# Patient Record
Sex: MALE | Race: WHITE | NOT HISPANIC OR LATINO | Employment: UNEMPLOYED | URBAN - METROPOLITAN AREA
[De-identification: names, ages, dates, MRNs, and addresses within clinical notes are randomized per-mention and may not be internally consistent; named-entity substitution may affect disease eponyms.]

---

## 2017-07-28 ENCOUNTER — ALLSCRIPTS OFFICE VISIT (OUTPATIENT)
Dept: OTHER | Facility: OTHER | Age: 47
End: 2017-07-28

## 2017-08-11 ENCOUNTER — ALLSCRIPTS OFFICE VISIT (OUTPATIENT)
Dept: OTHER | Facility: OTHER | Age: 47
End: 2017-08-11

## 2018-01-10 NOTE — MISCELLANEOUS
Provider Comments  Provider Comments:   Patient is a no call no show for his appointment on July 28th 2017      Signatures   Electronically signed by : Natalya Lovett, ; Jul 28 2017  9:15AM EST                       (Author)

## 2018-01-11 NOTE — MISCELLANEOUS
Provider Comments  Provider Comments:   Patient is a not call no show for his appointment on August 11th      Signatures   Electronically signed by : Sade Ovalle, ; Aug 11 2017 11:20AM EST                       (Author)

## 2020-09-08 ENCOUNTER — HOSPITAL ENCOUNTER (EMERGENCY)
Facility: HOSPITAL | Age: 50
Discharge: HOME | End: 2020-09-08
Attending: EMERGENCY MEDICINE | Admitting: EMERGENCY MEDICINE
Payer: COMMERCIAL

## 2020-09-08 VITALS
WEIGHT: 162 LBS | DIASTOLIC BLOOD PRESSURE: 89 MMHG | TEMPERATURE: 97.8 F | HEART RATE: 88 BPM | RESPIRATION RATE: 20 BRPM | OXYGEN SATURATION: 100 % | SYSTOLIC BLOOD PRESSURE: 133 MMHG

## 2020-09-08 DIAGNOSIS — M54.32 SCIATICA OF LEFT SIDE: ICD-10-CM

## 2020-09-08 DIAGNOSIS — M54.9 BACK PAIN: Primary | ICD-10-CM

## 2020-09-08 PROCEDURE — 99283 EMERGENCY DEPT VISIT LOW MDM: CPT

## 2020-09-08 PROCEDURE — 99284 EMERGENCY DEPT VISIT MOD MDM: CPT | Performed by: EMERGENCY MEDICINE

## 2020-09-08 PROCEDURE — 96372 THER/PROPH/DIAG INJ SC/IM: CPT

## 2020-09-08 RX ORDER — METHOCARBAMOL 500 MG/1
1500 TABLET, FILM COATED ORAL ONCE
Status: COMPLETED | OUTPATIENT
Start: 2020-09-08 | End: 2020-09-08

## 2020-09-08 RX ORDER — KETOROLAC TROMETHAMINE 30 MG/ML
30 INJECTION, SOLUTION INTRAMUSCULAR; INTRAVENOUS ONCE
Status: COMPLETED | OUTPATIENT
Start: 2020-09-08 | End: 2020-09-08

## 2020-09-08 RX ORDER — METHOCARBAMOL 500 MG/1
1000 TABLET, FILM COATED ORAL 2 TIMES DAILY
Qty: 30 TABLET | Refills: 0 | Status: ON HOLD | OUTPATIENT
Start: 2020-09-08 | End: 2021-03-09 | Stop reason: SDUPTHER

## 2020-09-08 RX ORDER — IBUPROFEN 800 MG/1
800 TABLET ORAL 3 TIMES DAILY
Qty: 21 TABLET | Refills: 0 | Status: ON HOLD | OUTPATIENT
Start: 2020-09-08 | End: 2021-03-09 | Stop reason: SDUPTHER

## 2020-09-08 RX ADMIN — METHOCARBAMOL 1500 MG: 500 TABLET, FILM COATED ORAL at 15:56

## 2020-09-08 RX ADMIN — KETOROLAC TROMETHAMINE 30 MG: 30 INJECTION, SOLUTION INTRAMUSCULAR; INTRAVENOUS at 15:56

## 2020-09-08 NOTE — Clinical Note
Antonette Mesa was seen and treated in our emergency department on 9/8/2020  Diagnosis:     Patrick Sotomayor  may return to work on return date  He may return on this date: 09/10/2020         If you have any questions or concerns, please don't hesitate to call        Elon Baumgarten, DO    ______________________________           _______________          _______________  Hospital Representative                              Date                                Time

## 2020-09-09 ENCOUNTER — TELEPHONE (OUTPATIENT)
Dept: PHYSICAL THERAPY | Facility: OTHER | Age: 50
End: 2020-09-09

## 2020-09-09 NOTE — TELEPHONE ENCOUNTER
Attempted to call patient per [facility] but unable to leave a voice message due to Voice Mailbox being full

## 2020-09-09 NOTE — ED PROVIDER NOTES
History  Chief Complaint   Patient presents with    Back Pain     pt has back pain that he states is debilitating and "the worst pain he ever had" started Saturday, became worse Sunday  left sided lower back radiating down leg  no recent injuries or hx of back pain       History provided by:  Patient  Back Pain   Location:  Lumbar spine  Quality:  Aching  Radiates to:  Does not radiate  Pain severity:  Moderate  Onset quality:  Gradual  Timing:  Constant  Progression:  Worsening  Chronicity:  New  Relieved by:  Nothing  Worsened by:  Nothing  Ineffective treatments:  None tried  Associated symptoms: no abdominal pain, no abdominal swelling, no bladder incontinence, no bowel incontinence, no chest pain, no dysuria, no fever, no headaches, no leg pain, no numbness, no pelvic pain, no perianal numbness, no tingling and no weakness        None       Past Medical History:   Diagnosis Date    Hyperlipidemia     Hypertension        History reviewed  No pertinent surgical history  History reviewed  No pertinent family history  I have reviewed and agree with the history as documented  E-Cigarette/Vaping    E-Cigarette Use Never User      E-Cigarette/Vaping Substances    Nicotine No     THC No     CBD No     Flavoring No     Other No     Unknown No      Social History     Tobacco Use    Smoking status: Current Every Day Smoker     Packs/day: 1 00    Smokeless tobacco: Never Used   Substance Use Topics    Alcohol use: Never     Frequency: Never    Drug use: Never       Review of Systems   Constitutional: Negative for chills and fever  HENT: Negative for rhinorrhea, sore throat and trouble swallowing  Eyes: Negative for pain  Respiratory: Negative for cough, shortness of breath, wheezing and stridor  Cardiovascular: Negative for chest pain and leg swelling  Gastrointestinal: Negative for abdominal pain, bowel incontinence, diarrhea and nausea  Endocrine: Negative for polyuria     Genitourinary: Negative for bladder incontinence, dysuria, flank pain, pelvic pain and urgency  Musculoskeletal: Positive for back pain  Negative for joint swelling, myalgias and neck stiffness  Skin: Negative for rash  Allergic/Immunologic: Negative for immunocompromised state  Neurological: Negative for dizziness, tingling, syncope, weakness, numbness and headaches  Psychiatric/Behavioral: Negative for confusion and suicidal ideas  All other systems reviewed and are negative  Physical Exam  Physical Exam  Vitals signs and nursing note reviewed  Constitutional:       Appearance: He is well-developed  HENT:      Head: Normocephalic and atraumatic  Eyes:      Pupils: Pupils are equal, round, and reactive to light  Neck:      Musculoskeletal: Normal range of motion and neck supple  Cardiovascular:      Rate and Rhythm: Normal rate and regular rhythm  Heart sounds: No murmur  No friction rub  Pulmonary:      Effort: No respiratory distress  Breath sounds: Normal breath sounds  No wheezing or rales  Abdominal:      General: Bowel sounds are normal  There is no distension  Palpations: Abdomen is soft  Tenderness: There is no abdominal tenderness  Musculoskeletal: Normal range of motion  General: Tenderness present  Skin:     General: Skin is warm  Findings: No rash  Neurological:      Mental Status: He is alert and oriented to person, place, and time           Vital Signs  ED Triage Vitals [09/08/20 1521]   Temperature Pulse Respirations Blood Pressure SpO2   97 8 °F (36 6 °C) 88 20 133/89 100 %      Temp Source Heart Rate Source Patient Position - Orthostatic VS BP Location FiO2 (%)   Tympanic Monitor Lying Left arm --      Pain Score       Worst Possible Pain           Vitals:    09/08/20 1521   BP: 133/89   Pulse: 88   Patient Position - Orthostatic VS: Lying         Visual Acuity      ED Medications  Medications   ketorolac (TORADOL) injection 30 mg (30 mg Intramuscular Given 9/8/20 1556)   methocarbamol (ROBAXIN) tablet 1,500 mg (1,500 mg Oral Given 9/8/20 1556)       Diagnostic Studies  Results Reviewed     None                 No orders to display              Procedures  Procedures         ED Course                                       MDM  Number of Diagnoses or Management Options  Back pain: new and requires workup  Sciatica of left side: new and requires workup  Diagnosis management comments: 48 y o  male presents with chief complaint of left low back pain radiating into left  leg  No concerning s/s for cauda equina or epidural abscess  Will treat with analgesia and anti-inflammatory regimen and refer to spine if symptoms persist        No spinous process tenderness, hypertonicity paraspinal musculature consistent with acute muscle spasm , +2 patella and Achilles reflex bilaterally  Normal sensation normal muscle strength bilateral lower extremities    Denies history of severe or worsening lower extremity weakness/numbness  Denies history of saddle anesthesia/perineal anesthesia  Denies bowel or bladder incontinence/retention  History does not suggest diagnosis of cauda equina syndrome  Patient denies history of IVDA, denies history of fevers, no recent surgeries or any procedures to suggest a transient bacteremia leading to a diagnosis of subdural abscess  Denies history of blood thinner use with recent history of lumbar puncture or any violation of the epidural space to suggest history of epidural hematoma  Therefore these above diagnoses (cauda equina syndrome, epidural abscess, epidural hematoma) were not pursued with diagnostic imaging  Pt with non-traumatic back pain  Considered cauda equina, pathologic spine injuries, AAA, aortic dissection, epidural abscess, cord syndromes, ureterolithiasis, pyelonephritis  Pt has no indications for emergent imaging as pt not elderly, denies fever, denies IVDA, no hx of cancer, no chronic steroid use, denies loss of bowel or bladder control, denies numbness in perinuem, on exam MS intact throughout, including at B/L hips, knees, ankle plantar/dorsiflexion and extensor/flexor hallicus  Pt with good sensation in appropriate dermatomes  Pt able to walk while in ED without difficulty  Suspect pt's pain sciatica pain  Treating with analgesics and discussed importance of return if any difficulty walking, numbness, weakness, loss of bowel or bladder control, pain worsening, fevers, or other concerns  Discussed importance of PCP f/u and that pt will likely require MRI to better evaluate area if pain not improving in about 1 wk (unless sx worsen sooner)  Pt agrees and will return if any further concerns  Pt re-examined and evaluated after testing and treatment  Spoke with the patient and feeling improved and sxs have resolved  Will discharge home with close f/u with pcp and instructed to return to the ED if sxs worsen or continue  Pt agrees with the plan for discharge and feels comfortable to go home with proper f/u  Advised to return for worsening or additional problems  Diagnostic tests were reviewed and questions answered  Diagnosis, care plan and treatment options were discussed  The patient understand instructions and will follow up as directed  Counseling: I had a detailed discussion with the patient and/or guardian regarding: the historical points, exam findings, and any diagnostic results supporting the discharge diagnosis, lab results, radiology results, discharge instructions reviewed with patient and/or family/caregiver and understanding was verbalized  Instructions given to return to the emergency department if symptoms worsen or persist, or if there are any questions or concerns that arise at home       All labs reviewed and utilized in the medical decision making process    All radiology studies independently viewed by me and interpreted by the radiologist             Disposition  Final diagnoses:   Back pain Sciatica of left side     Time reflects when diagnosis was documented in both MDM as applicable and the Disposition within this note     Time User Action Codes Description Comment    9/8/2020  4:48 PM Toñito Alamo Add [M54 9] Back pain     9/8/2020  4:48 PM Toñito Alamo Add [M54 32] Sciatica of left side       ED Disposition     ED Disposition Condition Date/Time Comment    Discharge Stable Tue Sep 8, 2020  4:48 PM Kamala Hurtado discharge to home/self care              Follow-up Information    None         Discharge Medication List as of 9/8/2020  4:49 PM      START taking these medications    Details   ibuprofen (MOTRIN) 800 mg tablet Take 1 tablet (800 mg total) by mouth 3 (three) times a day, Starting Tue 9/8/2020, Normal      methocarbamol (ROBAXIN) 500 mg tablet Take 2 tablets (1,000 mg total) by mouth 2 (two) times a day, Starting Tue 9/8/2020, Normal               PDMP Review     None          ED Provider  Electronically Signed by           Austin Chacon DO  09/08/20 1404

## 2020-09-14 ENCOUNTER — TELEPHONE (OUTPATIENT)
Dept: PHYSICAL THERAPY | Facility: OTHER | Age: 50
End: 2020-09-14

## 2020-09-14 NOTE — TELEPHONE ENCOUNTER
Attempted to call patient per [facility] referral but unable to leave a voice message due to Voice Mailbox being full  This the 2nd attempt to reach the patient  Will defer per protocol

## 2021-03-07 ENCOUNTER — HOSPITAL ENCOUNTER (EMERGENCY)
Facility: HOSPITAL | Age: 51
End: 2021-03-07
Attending: EMERGENCY MEDICINE
Payer: COMMERCIAL

## 2021-03-07 ENCOUNTER — APPOINTMENT (EMERGENCY)
Dept: CT IMAGING | Facility: HOSPITAL | Age: 51
End: 2021-03-07
Payer: COMMERCIAL

## 2021-03-07 ENCOUNTER — APPOINTMENT (OUTPATIENT)
Dept: RADIOLOGY | Facility: HOSPITAL | Age: 51
End: 2021-03-07
Payer: COMMERCIAL

## 2021-03-07 ENCOUNTER — HOSPITAL ENCOUNTER (OUTPATIENT)
Facility: HOSPITAL | Age: 51
Setting detail: OBSERVATION
LOS: 1 days | Discharge: HOME | End: 2021-03-09
Attending: INTERNAL MEDICINE | Admitting: INTERNAL MEDICINE
Payer: COMMERCIAL

## 2021-03-07 ENCOUNTER — APPOINTMENT (EMERGENCY)
Dept: RADIOLOGY | Facility: HOSPITAL | Age: 51
End: 2021-03-07
Payer: COMMERCIAL

## 2021-03-07 VITALS
SYSTOLIC BLOOD PRESSURE: 168 MMHG | OXYGEN SATURATION: 97 % | TEMPERATURE: 97.9 F | WEIGHT: 155 LBS | BODY MASS INDEX: 23.49 KG/M2 | HEART RATE: 84 BPM | RESPIRATION RATE: 16 BRPM | HEIGHT: 68 IN | DIASTOLIC BLOOD PRESSURE: 97 MMHG

## 2021-03-07 DIAGNOSIS — F19.90 IV DRUG USER: Primary | ICD-10-CM

## 2021-03-07 DIAGNOSIS — A41.9 SEPSIS (HCC): ICD-10-CM

## 2021-03-07 DIAGNOSIS — I10 HYPERTENSION: ICD-10-CM

## 2021-03-07 DIAGNOSIS — M54.9 BACK PAIN: ICD-10-CM

## 2021-03-07 DIAGNOSIS — M54.40 ACUTE LEFT-SIDED LOW BACK PAIN WITH SCIATICA: Primary | ICD-10-CM

## 2021-03-07 DIAGNOSIS — M54.32 SCIATICA OF LEFT SIDE: ICD-10-CM

## 2021-03-07 DIAGNOSIS — F15.10 METHAMPHETAMINE USE (HCC): ICD-10-CM

## 2021-03-07 DIAGNOSIS — M54.10 BACK PAIN WITH RADICULOPATHY: ICD-10-CM

## 2021-03-07 DIAGNOSIS — M54.17 RADICULOPATHY, LUMBOSACRAL REGION: ICD-10-CM

## 2021-03-07 PROBLEM — E87.6 HYPOKALEMIA: Status: ACTIVE | Noted: 2021-03-07

## 2021-03-07 LAB
ALBUMIN SERPL BCP-MCNC: 5.2 G/DL (ref 3.5–5.7)
ALP SERPL-CCNC: 89 U/L (ref 40–150)
ALT SERPL W P-5'-P-CCNC: 19 U/L (ref 7–52)
AMPHETAMINES SERPL QL SCN: POSITIVE
ANION GAP SERPL CALCULATED.3IONS-SCNC: 12 MMOL/L (ref 4–13)
AST SERPL W P-5'-P-CCNC: 17 U/L (ref 13–39)
BACTERIA UR QL AUTO: ABNORMAL /HPF
BARBITURATES UR QL: NEGATIVE
BASOPHILS # BLD AUTO: 0.1 THOUSANDS/ΜL (ref 0–0.1)
BASOPHILS NFR BLD AUTO: 1 % (ref 0–2)
BENZODIAZ UR QL: NEGATIVE
BILIRUB SERPL-MCNC: 0.4 MG/DL (ref 0.2–1)
BILIRUB UR QL STRIP: NEGATIVE
BUN SERPL-MCNC: 18 MG/DL (ref 7–25)
CALCIUM SERPL-MCNC: 10.1 MG/DL (ref 8.6–10.5)
CHLORIDE SERPL-SCNC: 93 MMOL/L (ref 98–107)
CLARITY UR: CLEAR
CO2 SERPL-SCNC: 32 MMOL/L (ref 21–31)
COCAINE UR QL: NEGATIVE
COLOR UR: YELLOW
CREAT SERPL-MCNC: 0.92 MG/DL (ref 0.7–1.3)
EOSINOPHIL # BLD AUTO: 0 THOUSAND/ΜL (ref 0–0.61)
EOSINOPHIL NFR BLD AUTO: 0 % (ref 0–5)
ERYTHROCYTE [DISTWIDTH] IN BLOOD BY AUTOMATED COUNT: 14 % (ref 11.5–14.5)
ERYTHROCYTE [SEDIMENTATION RATE] IN BLOOD: 11 MM/HOUR (ref 0–19)
FLUAV RNA RESP QL NAA+PROBE: NEGATIVE
FLUBV RNA RESP QL NAA+PROBE: NEGATIVE
GFR SERPL CREATININE-BSD FRML MDRD: 97 ML/MIN/1.73SQ M
GLUCOSE SERPL-MCNC: 109 MG/DL (ref 65–99)
GLUCOSE UR STRIP-MCNC: NEGATIVE MG/DL
HCT VFR BLD AUTO: 50.5 % (ref 42–47)
HGB BLD-MCNC: 16.8 G/DL (ref 14–18)
HGB UR QL STRIP.AUTO: ABNORMAL
KETONES UR STRIP-MCNC: NEGATIVE MG/DL
LACTATE SERPL-SCNC: 1.3 MMOL/L (ref 0.5–2)
LACTATE SERPL-SCNC: 2.2 MMOL/L (ref 0.5–2)
LEUKOCYTE ESTERASE UR QL STRIP: NEGATIVE
LYMPHOCYTES # BLD AUTO: 4 THOUSANDS/ΜL (ref 0.6–4.47)
LYMPHOCYTES NFR BLD AUTO: 23 % (ref 21–51)
MCH RBC QN AUTO: 29.8 PG (ref 26–34)
MCHC RBC AUTO-ENTMCNC: 33.3 G/DL (ref 31–37)
MCV RBC AUTO: 89 FL (ref 81–99)
METHADONE UR QL: NEGATIVE
MONOCYTES # BLD AUTO: 1 THOUSAND/ΜL (ref 0.17–1.22)
MONOCYTES NFR BLD AUTO: 6 % (ref 2–12)
NEUTROPHILS # BLD AUTO: 12.2 THOUSANDS/ΜL (ref 1.4–6.5)
NEUTS SEG NFR BLD AUTO: 70 % (ref 42–75)
NITRITE UR QL STRIP: NEGATIVE
NON-SQ EPI CELLS URNS QL MICRO: ABNORMAL /HPF
OPIATES UR QL SCN: NEGATIVE
OXYCODONE+OXYMORPHONE UR QL SCN: NEGATIVE
PCP UR QL: NEGATIVE
PH UR STRIP.AUTO: 6 [PH]
PLATELET # BLD AUTO: 330 THOUSANDS/UL (ref 149–390)
PLATELET # BLD AUTO: 419 THOUSANDS/UL (ref 149–390)
PMV BLD AUTO: 7.1 FL (ref 8.6–11.7)
PMV BLD AUTO: 9 FL (ref 8.9–12.7)
POTASSIUM SERPL-SCNC: 3.1 MMOL/L (ref 3.5–5.5)
PROT SERPL-MCNC: 8.4 G/DL (ref 6.4–8.9)
PROT UR STRIP-MCNC: NEGATIVE MG/DL
RBC # BLD AUTO: 5.65 MILLION/UL (ref 4.3–5.9)
RBC #/AREA URNS AUTO: ABNORMAL /HPF
RSV RNA RESP QL NAA+PROBE: NEGATIVE
SARS-COV-2 RNA RESP QL NAA+PROBE: NEGATIVE
SODIUM SERPL-SCNC: 137 MMOL/L (ref 134–143)
SP GR UR STRIP.AUTO: 1.02 (ref 1–1.03)
THC UR QL: NEGATIVE
UROBILINOGEN UR QL STRIP.AUTO: 0.2 E.U./DL
WBC # BLD AUTO: 17.4 THOUSAND/UL (ref 4.8–10.8)
WBC #/AREA URNS AUTO: ABNORMAL /HPF

## 2021-03-07 PROCEDURE — 83605 ASSAY OF LACTIC ACID: CPT | Performed by: INTERNAL MEDICINE

## 2021-03-07 PROCEDURE — 36415 COLL VENOUS BLD VENIPUNCTURE: CPT | Performed by: PHYSICIAN ASSISTANT

## 2021-03-07 PROCEDURE — 81001 URINALYSIS AUTO W/SCOPE: CPT | Performed by: PHYSICIAN ASSISTANT

## 2021-03-07 PROCEDURE — G0379 DIRECT REFER HOSPITAL OBSERV: HCPCS

## 2021-03-07 PROCEDURE — 72158 MRI LUMBAR SPINE W/O & W/DYE: CPT

## 2021-03-07 PROCEDURE — 72131 CT LUMBAR SPINE W/O DYE: CPT

## 2021-03-07 PROCEDURE — 80053 COMPREHEN METABOLIC PANEL: CPT | Performed by: PHYSICIAN ASSISTANT

## 2021-03-07 PROCEDURE — 83605 ASSAY OF LACTIC ACID: CPT | Performed by: PHYSICIAN ASSISTANT

## 2021-03-07 PROCEDURE — 0241U HB NFCT DS VIR RESP RNA 4 TRGT: CPT | Performed by: PHYSICIAN ASSISTANT

## 2021-03-07 PROCEDURE — G1004 CDSM NDSC: HCPCS

## 2021-03-07 PROCEDURE — 99285 EMERGENCY DEPT VISIT HI MDM: CPT | Performed by: PHYSICIAN ASSISTANT

## 2021-03-07 PROCEDURE — 85652 RBC SED RATE AUTOMATED: CPT | Performed by: PHYSICIAN ASSISTANT

## 2021-03-07 PROCEDURE — 87040 BLOOD CULTURE FOR BACTERIA: CPT | Performed by: PHYSICIAN ASSISTANT

## 2021-03-07 PROCEDURE — 85025 COMPLETE CBC W/AUTO DIFF WBC: CPT | Performed by: PHYSICIAN ASSISTANT

## 2021-03-07 PROCEDURE — 85049 AUTOMATED PLATELET COUNT: CPT | Performed by: INTERNAL MEDICINE

## 2021-03-07 PROCEDURE — 96375 TX/PRO/DX INJ NEW DRUG ADDON: CPT

## 2021-03-07 PROCEDURE — 96361 HYDRATE IV INFUSION ADD-ON: CPT

## 2021-03-07 PROCEDURE — 96365 THER/PROPH/DIAG IV INF INIT: CPT

## 2021-03-07 PROCEDURE — 80307 DRUG TEST PRSMV CHEM ANLYZR: CPT | Performed by: PHYSICIAN ASSISTANT

## 2021-03-07 PROCEDURE — 99285 EMERGENCY DEPT VISIT HI MDM: CPT

## 2021-03-07 PROCEDURE — 81003 URINALYSIS AUTO W/O SCOPE: CPT | Performed by: PHYSICIAN ASSISTANT

## 2021-03-07 PROCEDURE — 71045 X-RAY EXAM CHEST 1 VIEW: CPT

## 2021-03-07 RX ORDER — SODIUM CHLORIDE, SODIUM GLUCONATE, SODIUM ACETATE, POTASSIUM CHLORIDE, MAGNESIUM CHLORIDE, SODIUM PHOSPHATE, DIBASIC, AND POTASSIUM PHOSPHATE .53; .5; .37; .037; .03; .012; .00082 G/100ML; G/100ML; G/100ML; G/100ML; G/100ML; G/100ML; G/100ML
1000 INJECTION, SOLUTION INTRAVENOUS ONCE
Status: COMPLETED | OUTPATIENT
Start: 2021-03-07 | End: 2021-03-07

## 2021-03-07 RX ORDER — LISINOPRIL 40 MG/1
20 TABLET ORAL 2 TIMES DAILY
COMMUNITY
End: 2021-03-09 | Stop reason: HOSPADM

## 2021-03-07 RX ORDER — METHOCARBAMOL 500 MG/1
500 TABLET, FILM COATED ORAL EVERY 6 HOURS PRN
Status: DISCONTINUED | OUTPATIENT
Start: 2021-03-07 | End: 2021-03-09

## 2021-03-07 RX ORDER — KETOROLAC TROMETHAMINE 30 MG/ML
30 INJECTION, SOLUTION INTRAMUSCULAR; INTRAVENOUS ONCE
Status: COMPLETED | OUTPATIENT
Start: 2021-03-07 | End: 2021-03-07

## 2021-03-07 RX ORDER — MULTIVIT-MIN/IRON FUM/FOLIC AC 7.5 MG-4
1 TABLET ORAL DAILY
COMMUNITY

## 2021-03-07 RX ORDER — CEFEPIME HYDROCHLORIDE 2 G/50ML
2000 INJECTION, SOLUTION INTRAVENOUS ONCE
Status: COMPLETED | OUTPATIENT
Start: 2021-03-07 | End: 2021-03-07

## 2021-03-07 RX ORDER — IBUPROFEN 400 MG/1
400 TABLET ORAL EVERY 6 HOURS PRN
Status: DISCONTINUED | OUTPATIENT
Start: 2021-03-07 | End: 2021-03-08

## 2021-03-07 RX ORDER — POTASSIUM CHLORIDE 20 MEQ/1
40 TABLET, EXTENDED RELEASE ORAL ONCE
Status: COMPLETED | OUTPATIENT
Start: 2021-03-07 | End: 2021-03-07

## 2021-03-07 RX ORDER — VANCOMYCIN HYDROCHLORIDE 1 G/200ML
15 INJECTION, SOLUTION INTRAVENOUS ONCE
Status: DISCONTINUED | OUTPATIENT
Start: 2021-03-07 | End: 2021-03-07 | Stop reason: HOSPADM

## 2021-03-07 RX ORDER — ACETAMINOPHEN 325 MG/1
650 TABLET ORAL EVERY 6 HOURS PRN
Status: DISCONTINUED | OUTPATIENT
Start: 2021-03-07 | End: 2021-03-09 | Stop reason: HOSPADM

## 2021-03-07 RX ORDER — ACETAMINOPHEN 500 MG
1000 TABLET ORAL EVERY 6 HOURS PRN
COMMUNITY

## 2021-03-07 RX ORDER — NICOTINE 21 MG/24HR
1 PATCH, TRANSDERMAL 24 HOURS TRANSDERMAL DAILY
Status: DISCONTINUED | OUTPATIENT
Start: 2021-03-08 | End: 2021-03-09 | Stop reason: HOSPADM

## 2021-03-07 RX ORDER — POLYETHYLENE GLYCOL 3350 17 G/17G
17 POWDER, FOR SOLUTION ORAL DAILY
Status: DISCONTINUED | OUTPATIENT
Start: 2021-03-08 | End: 2021-03-09 | Stop reason: HOSPADM

## 2021-03-07 RX ADMIN — ACETAMINOPHEN 650 MG: 325 TABLET ORAL at 23:29

## 2021-03-07 RX ADMIN — SODIUM CHLORIDE, SODIUM GLUCONATE, SODIUM ACETATE, POTASSIUM CHLORIDE, MAGNESIUM CHLORIDE, SODIUM PHOSPHATE, DIBASIC, AND POTASSIUM PHOSPHATE 1000 ML: .53; .5; .37; .037; .03; .012; .00082 INJECTION, SOLUTION INTRAVENOUS at 18:16

## 2021-03-07 RX ADMIN — KETOROLAC TROMETHAMINE 30 MG: 30 INJECTION, SOLUTION INTRAMUSCULAR; INTRAVENOUS at 17:21

## 2021-03-07 RX ADMIN — POTASSIUM CHLORIDE 40 MEQ: 1500 TABLET, EXTENDED RELEASE ORAL at 17:56

## 2021-03-07 RX ADMIN — METHOCARBAMOL 500 MG: 500 TABLET, FILM COATED ORAL at 21:15

## 2021-03-07 RX ADMIN — CEFEPIME HYDROCHLORIDE 2000 MG: 2 INJECTION, SOLUTION INTRAVENOUS at 18:11

## 2021-03-07 RX ADMIN — SODIUM CHLORIDE 1000 ML: 0.9 INJECTION, SOLUTION INTRAVENOUS at 17:19

## 2021-03-07 RX ADMIN — IBUPROFEN 400 MG: 400 TABLET ORAL at 21:15

## 2021-03-07 RX ADMIN — VANCOMYCIN HYDROCHLORIDE 1000 MG: 1 INJECTION, SOLUTION INTRAVENOUS at 18:41

## 2021-03-07 NOTE — EMTALA/ACUTE CARE TRANSFER
[facility]  [address] [address] [phone]  Dept: [phone]      EMTALA TRANSFER CONSENT    NAME Jeny Sequeira                                         1970                              MRN [id number]    I have been informed of my rights regarding examination, treatment, and transfer   by Dr Ritchie Galvan III, DO    Benefits: Specialized equipment and/or services available at the receiving facility (Include comment)________________________    Risks: Potential for delay in receiving treatment      Transfer Request   I acknowledge that my medical condition has been evaluated and explained to me by the emergency department physician or other qualified medical person and/or my attending physician who has recommended and offered to me further medical examination and treatment  I understand the Hospital's obligation with respect to the treatment and stabilization of my emergency medical condition  I nevertheless request to be transferred  I release the Hospital, the doctor, and any other persons caring for me from all responsibility or liability for any injury or ill effects that may result from my transfer and agree to accept all responsibility for the consequences of my choice to transfer, rather than receive stabilizing treatment at the Hospital  I understand that because the transfer is my request, my insurance may not provide reimbursement for the services  The Hospital will assist and direct me and my family in how to make arrangements for transfer, but the hospital is not liable for any fees charged by the transport service  In spite of this understanding, I refuse to consent to further medical examination and treatment which has been offered to me, and request transfer to  [facility] [address]   I authorize the performance of emergency medical procedures and treatments upon me in both transit and upon arrival at the receiving facility  Additionally, I authorize the release of any and all medical records to the receiving facility and request they be transported with me, if possible  I authorize the performance of emergency medical procedures and treatments upon me in both transit and upon arrival at the receiving facility  Additionally, I authorize the release of any and all medical records to the receiving facility and request they be transported with me, if possible  I understand that the safest mode of transportation during a medical emergency is an ambulance and that the Hospital advocates the use of this mode of transport  Risks of traveling to the receiving facility by car, including absence of medical control, life sustaining equipment, such as oxygen, and medical personnel has been explained to me and I fully understand them  (LUCY CORRECT BOX BELOW)  [  ]  I consent to the stated transfer and to be transported by ambulance/helicopter  [  ]  I consent to the stated transfer, but refuse transportation by ambulance and accept full responsibility for my transportation by car  I understand the risks of non-ambulance transfers and I exonerate the Hospital and its staff from any deterioration in my condition that results from this refusal     X___________________________________________    DATE  21  TIME________  Signature of patient or legally responsible individual signing on patient behalf           RELATIONSHIP TO PATIENT_________________________          Provider Certification    NAME Sanjuanita Mitchell                                        [facility] 1970                              MRN [id number]    A medical screening exam was performed on the above named patient  Based on the examination:    Condition Necessitating Transfer The primary encounter diagnosis was IV drug user   Diagnoses of Methamphetamine use ([facility] [address] ), Sepsis ([facility] [address] ), and Back pain with radiculopathy were also pertinent to this visit  Patient Condition: The patient has been stabilized such that within reasonable medical probability, no material deterioration of the patient condition or the condition of the unborn child(diego) is likely to result from the transfer    Reason for Transfer: Level of Care needed not available at this facility    Transfer Requirements: [facility] King   · Space available and qualified personnel available for treatment as acknowledged by    · Agreed to accept transfer and to provide appropriate medical treatment as acknowledged by       Dr Yusuf Bernal  · Appropriate medical records of the examination and treatment of the patient are provided at the time of transfer   [address] [facility] _______  · Transfer will be performed by qualified personnel from    and appropriate transfer equipment as required, including the use of necessary and appropriate life support measures      Provider Certification: I have examined the patient and explained the following risks and benefits of being transferred/refusing transfer to the patient/family:  General risk, such as traffic hazards, adverse weather conditions, rough terrain or turbulence, possible failure of equipment (including vehicle or aircraft), or consequences of actions of persons outside the control of the transport personnel, Unanticipated needs of medical equipment and personnel during transport, Risk of worsening condition, The possibility of a transport vehicle being unavailable, Consent was not obtained as patient is committed to psychiatric facility and transfer is mandated      Based on these reasonable risks and benefits to the patient and/or the unborn child(diego), and based upon the information available at the time of the patients examination, I certify that the medical benefits reasonably to be expected from the provision of appropriate medical treatments at another medical facility outweigh the increasing risks, if any, to the individuals medical condition, and in the case of labor to the unborn child, from effecting the transfer      X____________________________________________ DATE 03/07/21        TIME_______      ORIGINAL - SEND TO MEDICAL RECORDS   COPY - SEND WITH PATIENT DURING TRANSFER

## 2021-03-07 NOTE — LETTER
[facility] [address] [address]  Dept: [phone]    March 9, 2021     Patient: Curt Pimentel   YOB: 1970   Date of Visit: 3/7/2021       To Whom it May Concern:    Odin Monzon is under my professional care  He was seen in the hospital from 3/7/2021   to 03/09/21  He may return to work on 3/16/21 or when tolerated with the following limitations : patient will need lumbar support brace and may need additional breaks or considerations  If you have any questions or concerns, please don't hesitate to call           Sincerely,          Deandra Rock MD

## 2021-03-07 NOTE — ED ATTENDING ATTESTATION
3/7/2021  I, [facility], saw and evaluated the patient  I have discussed the patient with the resident/non-physician practitioner and agree with the resident's/non-physician practitioner's findings, Plan of Care, and MDM as documented in the resident's/non-physician practitioner's note, except where noted  All available labs and Radiology studies were reviewed  I was present for key portions of any procedure(s) performed by the resident/non-physician practitioner and I was immediately available to provide assistance  At this point I agree with the current assessment done in the Emergency Department  I have conducted an independent evaluation of this patient a history and physical is as follows:    ED Course         Critical Care Time  Procedures    This is a very pleasant 59-year-old gentleman presents the emergency department with a history of hyperlipidemia and hypertension  Patient reports worsening mid lumbar lower back pain over 24-36 hours worsening with radiation to L lower sciatic radicular component  Patient reports having a significant diaphoresis worsening today's never had this before  Patient has a longstanding history of IV drug use, drug of choice recently is IV methamphetamines  Prior use was heroin where he has been in rehab twice  Patient reports have trouble emptying his bladder today  Prior back pain, seen at [facility]: Sept 2020: [facility]: no follow with this service as per patient  Post void residual: 97 cc  18 K WBC with elevated lactate  On exam pt is extremely diaphoretic  BC x 2 ordered, see PA-C note for specifics, pt will need transfer to [facility] for MRI and work up  Diff dx:  Epidural abscess versus spinal abscess versus osteomyelitis of the lumbar spine versus endocarditis (no murmur on exam, no [place] lesions  / splinter hemorrhages noted

## 2021-03-07 NOTE — ED NOTES
Report given EMS via bedside report   Medication handoff to EMS      Carin Mohamud RN  03/07/21 0638

## 2021-03-07 NOTE — ASSESSMENT & PLAN NOTE
Reports IV use of methamphetamine mixed with tap water  Urine screen positive  Reported to ED that he used yesterday but not today     At risk for bacteremia, SEA, endocarditis   CM consulted - patient refuses all information and interventions   Advised to f/u w/ PCP and discussed dangers of continuing use, particularly with ongoing health issues, patient endorsed understanding and agreement

## 2021-03-07 NOTE — ED PROVIDER NOTES
History  Chief Complaint   Patient presents with    Hip Pain     Dx sciatita over the summer, LEFT hip pain shooting down leg      79-year-old male presents emerged department complaining of worsening left lower back pain radiating down the left leg  He also reports new onset diaphoresis and difficulty initiating urination and difficulty passing bowel movements  He states that he is an active IV drug user who last used IV methamphetamine last night  He states that he does occasionally reuse needles and syringes  He mixes his methamphetamine with bottled water  He presents significantly diaphoretic with an abnormal gait  He does have a history of sciatica however he states that his sciatica as primarily on his right side and is not this severe  He states this pain is definitely different than his sciatica on the right side previously  He provides "something is wrong  "  He states that he feels sick  He denies chest pain nausea vomiting shortness of breath      Allergies reviewed          Prior to Admission Medications   Prescriptions Last Dose Informant Patient Reported? Taking?   ibuprofen (MOTRIN) 800 mg tablet   No No   Sig: Take 1 tablet (800 mg total) by mouth 3 (three) times a day   methocarbamol (ROBAXIN) 500 mg tablet   No No   Sig: Take 2 tablets (1,000 mg total) by mouth 2 (two) times a day      Facility-Administered Medications: None       Past Medical History:   Diagnosis Date    Hyperlipidemia     Hypertension        History reviewed  No pertinent surgical history  History reviewed  No pertinent family history  I have reviewed and agree with the history as documented      E-Cigarette/Vaping    E-Cigarette Use Never User      E-Cigarette/Vaping Substances    Nicotine No     THC No     CBD No     Flavoring No     Other No     Unknown No      Social History     Tobacco Use    Smoking status: Current Every Day Smoker     Packs/day: 1 00    Smokeless tobacco: Never Used   Substance Use Topics    Alcohol use: Never     Frequency: Never    Drug use: Never       Review of Systems   Constitutional: Negative for chills, fatigue and fever  HENT: Negative for congestion, ear pain, rhinorrhea, sinus pressure, sneezing and sore throat  Eyes: Negative for pain and discharge  Respiratory: Negative for cough, choking, chest tightness, shortness of breath and wheezing  Cardiovascular: Negative for chest pain and palpitations  Gastrointestinal: Negative for abdominal pain, constipation, diarrhea, nausea and vomiting  Genitourinary: Negative for difficulty urinating and dysuria  Musculoskeletal: Positive for arthralgias, back pain and gait problem  Negative for neck pain and neck stiffness  Neurological: Negative for dizziness, light-headedness and headaches  All other systems reviewed and are negative  Physical Exam  Physical Exam  Vitals signs and nursing note reviewed  Constitutional:       General: He is not in acute distress  Appearance: He is well-developed  He is ill-appearing and diaphoretic  HENT:      Head: Normocephalic and atraumatic  Right Ear: External ear normal       Left Ear: External ear normal       Nose: Nose normal    Eyes:      Pupils: Pupils are equal, round, and reactive to light  Neck:      Musculoskeletal: Normal range of motion and neck supple  Cardiovascular:      Rate and Rhythm: Normal rate and regular rhythm  Heart sounds: Normal heart sounds  No murmur  No friction rub  No gallop  Pulmonary:      Effort: Pulmonary effort is normal  No respiratory distress  Breath sounds: Normal breath sounds  No stridor  No wheezing or rales  Abdominal:      General: Bowel sounds are normal  There is no distension  Palpations: Abdomen is soft  Tenderness: There is no abdominal tenderness  There is no guarding  Musculoskeletal: Normal range of motion  Lumbar back: He exhibits tenderness          Back:    Skin:     General: Skin is warm  Capillary Refill: Capillary refill takes less than 2 seconds  Neurological:      Mental Status: He is alert and oriented to person, place, and time  Psychiatric:         Behavior: Behavior is cooperative  Vital Signs  ED Triage Vitals [03/07/21 1623]   Temperature Pulse Respirations Blood Pressure SpO2   97 9 °F (36 6 °C) 98 16 (!) 171/101 99 %      Temp Source Heart Rate Source Patient Position - Orthostatic VS BP Location FiO2 (%)   Oral Monitor -- -- --      Pain Score       5           Vitals:    03/07/21 1623 03/07/21 1815   BP: (!) 171/101 168/97   Pulse: 98 84         Visual Acuity      ED Medications  Medications   sodium chloride 0 9 % bolus 1,000 mL (1,000 mL Intravenous New Bag 3/7/21 1719)   multi-electrolyte (ISOLYTE-S PH 7 4) bolus 1,000 mL (1,000 mL Intravenous New Bag 3/7/21 1816)   ketorolac (TORADOL) injection 30 mg (30 mg Intravenous Given 3/7/21 1721)   potassium chloride (K-DUR,KLOR-CON) CR tablet 40 mEq (40 mEq Oral Given 3/7/21 1756)   cefepime (MAXIPIME) IVPB (premix in dextrose) 2,000 mg 50 mL (0 mg Intravenous Stopped 3/7/21 1841)       Diagnostic Studies  Results Reviewed     Procedure Component Value Units Date/Time    COVID19, Influenza A/B, RSV PCR, [facility] [[id number]]  (Normal) Collected: 03/07/21 1717    Lab Status: Final result Specimen: Nares from Nasopharyngeal Swab Updated: 03/07/21 1810     SARS-CoV-2 Negative     INFLUENZA A PCR Negative     INFLUENZA B PCR Negative     RSV PCR Negative    Narrative: This test has been authorized by FDA under an EUA (Emergency Use Assay) for use by authorized laboratories  Clinical caution and judgement should be used with the interpretation of these results with consideration of the clinical impression and other laboratory testing  Testing reported as "Positive" or "Negative" has been proven to be accurate according to standard laboratory validation requirements    All testing is performed with control materials showing appropriate reactivity at standard intervals  Lactic acid [[id number]]  (Abnormal) Collected: 03/07/21 1716    Lab Status: Final result Specimen: Blood from Arm, Left Updated: 03/07/21 1748     LACTIC ACID 2 2 mmol/L     Narrative:      Result may be elevated if tourniquet was used during collection      Lactic acid 2 Hours [[id number]]     Lab Status: No result Specimen: Blood     Comprehensive metabolic panel [[id number]]  (Abnormal) Collected: 03/07/21 1716    Lab Status: Final result Specimen: Blood from Arm, Left Updated: 03/07/21 1747     Sodium 137 mmol/L      Potassium 3 1 mmol/L      Chloride 93 mmol/L      CO2 32 mmol/L      ANION GAP 12 mmol/L      BUN 18 mg/dL      Creatinine 0 92 mg/dL      Glucose 109 mg/dL      Calcium 10 1 mg/dL      AST 17 U/L      ALT 19 U/L      Alkaline Phosphatase 89 U/L      Total Protein 8 4 g/dL      Albumin 5 2 g/dL      Total Bilirubin 0 40 mg/dL      eGFR 97 ml/min/1 73sq m     Narrative:      [place] guidelines for Chronic Kidney Disease (CKD):     Stage 1 with normal or high GFR (GFR > 90 mL/min/1 73 square meters)    Stage 2 Mild CKD (GFR = 60-89 mL/min/1 73 square meters)    Stage 3A Moderate CKD (GFR = 45-59 mL/min/1 73 square meters)    Stage 3B Moderate CKD (GFR = 30-44 mL/min/1 73 square meters)    Stage 4 Severe CKD (GFR = 15-29 mL/min/1 73 square meters)    Stage 5 End Stage CKD (GFR <15 mL/min/1 73 square meters)  Note: GFR calculation is accurate only with a steady state creatinine    Rapid drug screen, urine [[id number]]  (Abnormal) Collected: 03/07/21 1716    Lab Status: Final result Specimen: Urine, Clean Catch Updated: 03/07/21 1742     Amph/Meth UR Positive     Barbiturate Ur Negative     Benzodiazepine Urine Negative     Cocaine Urine Negative     Methadone Urine Negative     Opiate Urine Negative     PCP Ur Negative     THC Urine Negative     Oxycodone Urine Negative    Narrative:      FOR MEDICAL PURPOSES ONLY    IF CONFIRMATION NEEDED PLEASE CONTACT THE LAB WITHIN 5 DAYS      Drug Screen Cutoff Levels:  AMPHETAMINE/METHAMPHETAMINES  1000 ng/mL  BARBITURATES     200 ng/mL  BENZODIAZEPINES     200 ng/mL  COCAINE      300 ng/mL  METHADONE      300 ng/mL  OPIATES      300 ng/mL  PHENCYCLIDINE     25 ng/mL  THC       50 ng/mL  OXYCODONE      100 ng/mL    Urine Microscopic [[id number]]  (Abnormal) Collected: 03/07/21 1716    Lab Status: Final result Specimen: Urine, Clean Catch Updated: 03/07/21 1735     RBC, UA 4-10 /hpf      WBC, UA 0-1 /hpf      Epithelial Cells Occasional /hpf      Bacteria, UA None Seen /hpf     Sedimentation rate, automated [[id number]]  (Normal) Collected: 03/07/21 1716    Lab Status: Final result Specimen: Blood from Arm, Left Updated: 03/07/21 1729     Sed Rate 11 mm/hour     CBC and differential [[id number]]  (Abnormal) Collected: 03/07/21 1716    Lab Status: Final result Specimen: Blood from Arm, Left Updated: 03/07/21 1729     WBC 17 40 Thousand/uL      RBC 5 65 Million/uL      Hemoglobin 16 8 g/dL      Hematocrit 50 5 %      MCV 89 fL      MCH 29 8 pg      MCHC 33 3 g/dL      RDW 14 0 %      MPV 7 1 fL      Platelets 568 Thousands/uL      Neutrophils Relative 70 %      Lymphocytes Relative 23 %      Monocytes Relative 6 %      Eosinophils Relative 0 %      Basophils Relative 1 %      Neutrophils Absolute 12 20 Thousands/µL      Lymphocytes Absolute 4 00 Thousands/µL      Monocytes Absolute 1 00 Thousand/µL      Eosinophils Absolute 0 00 Thousand/µL      Basophils Absolute 0 10 Thousands/µL     UA (URINE) with reflex to Scope [[id number]]  (Abnormal) Collected: 03/07/21 1716    Lab Status: Final result Specimen: Urine, Clean Catch Updated: 03/07/21 1726     Color, UA Yellow     Clarity, UA Clear     Specific [place], UA 1 020     pH, UA 6 0     Leukocytes, UA Negative     Nitrite, UA Negative     Protein, UA Negative mg/dl      Glucose, UA Negative mg/dl      Ketones, UA Negative mg/dl Urobilinogen, UA 0 2 E U /dl      Bilirubin, UA Negative     Blood, UA 2+    Blood culture #1 [[id number]] Collected: 03/07/21 1716    Lab Status: In process Specimen: Blood from Arm, Right Updated: 03/07/21 1721    Blood culture #2 [[id number]] Collected: 03/07/21 1716    Lab Status: In process Specimen: Blood from Arm, Left Updated: 03/07/21 1721                 XR chest 1 view portable    (Results Pending)   CT spine lumbar without contrast    (Results Pending)              Procedures  Procedures         ED Course  ED Course as of Mar 07 1953   [place] Scale Mar 07, 2021   1710   Spoke with Dr Mary Puentes regarding MRI  Dr Mary Puentes approved  Patient will need to be transferred for MRI capability  [id number]  Patient reports injecting methamphetamines for the last several months  He states that sometimes he reviews is the same needle and syringe  He mixes his methamphetamine with water prior to injecting it  He states that he usually uses bottled water  1732 WBC(!): 17 40   1732 Absolute Neutrophils(!): 12 20   1749 LACTIC ACID(!!): 2 2   1749 Potassium(!): 3 1   1753 LACTIC ACID(!!): 2 2                             SBIRT 20yo+      Most Recent Value   SBIRT (24 yo +)   In order to provide better care to our patients, we are screening all of our patients for alcohol and drug use  Would it be okay to ask you these screening questions? Unable to answer at this time Filed at: 03/07/2021 1801                    MDM  Number of Diagnoses or Management Options  Back pain with radiculopathy: new and requires workup  IV drug user: new and requires workup  Methamphetamine use [facility]): new and requires workup  Sepsis [facility]): new and requires workup  Diagnosis management comments: Worsening back pain with left leg radiculopathy urinary and bowel symptoms  Case discussed with Dr Mary Puentes of Radiology  [facility] approves urgent MRI    Patient to be transferred to tertiary care center with MRI availability and on-call neuro surgery  Possible epidural abscess  Patient may also be bacteremic given his IV drug use diaphoresis lactic acidosis and elevated white blood cell count  Patient is agreeable to transfer and plan    Neurosurgery made aware of this patient       Amount and/or Complexity of Data Reviewed  Clinical lab tests: ordered and reviewed  Tests in the radiology section of CPT®: ordered and reviewed    Risk of Complications, Morbidity, and/or Mortality  Presenting problems: high  Diagnostic procedures: low  Management options: moderate        Disposition  Final diagnoses:   IV drug user   Methamphetamine use ([facility] [address] )   Sepsis ([facility] [address] )   Back pain with radiculopathy     Time reflects when diagnosis was documented in both MDM as applicable and the Disposition within this note     Time User Action Codes Description Comment    3/7/2021  6:01 PM Titi Augustin [F19 90] IV drug user     3/7/2021  6:01 PM Leonel Thurman [F15 10] Methamphetamine use ([facility] [address] )     3/7/2021  6:01 PM Leonel Thurman [A41 9] Sepsis ([facility] [address] )     3/7/2021  6:01 PM Titi Augustin [M54 10] Back pain with radiculopathy       ED Disposition     ED Disposition Condition Date/Time Comment    Transfer to Another Facility-In Network  [place] Mar 7, 2021  6:01 PM Monongaliayordy Miller should be transferred out to [facility]        MD Documentation      Most Recent Value   Patient Condition  The patient has been stabilized such that within reasonable medical probability, no material deterioration of the patient condition or the condition of the unborn child(diego) is likely to result from the transfer   Reason for Transfer  Level of Care needed not available at this facility   Benefits of Transfer  Specialized equipment and/or services available at the receiving facility (Include comment)________________________   Risks of Transfer  Potential for delay in receiving treatment   Accepting Physician  Dr Brodie Rodriguez Name, [address] [facility] Esteban Szymanski PA-C   Provider Certification  General risk, such as traffic hazards, adverse weather conditions, rough terrain or turbulence, possible failure of equipment (including vehicle or aircraft), or consequences of actions of persons outside the control of the transport personnel, Unanticipated needs of medical equipment and personnel during transport, Risk of worsening condition, The possibility of a transport vehicle being unavailable, Consent was not obtained as patient is committed to psychiatric facility and transfer is mandated      RN Documentation      Most [facility] Name, [facility]   Level of Care  Advanced life support      Follow-up Information    None         Discharge Medication List as of 3/7/2021  6:51 PM      CONTINUE these medications which have NOT CHANGED    Details   ibuprofen (MOTRIN) 800 mg tablet Take 1 tablet (800 mg total) by mouth 3 (three) times a day, Starting Tue 9/8/2020, Normal      methocarbamol (ROBAXIN) 500 mg tablet Take 2 tablets (1,000 mg total) by mouth 2 (two) times a day, Starting Tue 9/8/2020, Normal           No discharge procedures on file      PDMP Review     None          ED Provider  Electronically Signed by           Linda Olvera PA-C  03/07/21 1953

## 2021-03-07 NOTE — ASSESSMENT & PLAN NOTE
Patient mets sepsis criteria on admission with leukocytosis and lactic acidosis   Given active IV drug use as well as new neurologic symptoms as detailed above, there is concern for possible spinal epidural abscess   Received cefepime 2 mg prior to transfer, 2 L IV fluid boluses   Stat MRI approved by Radiology (Dr Marcellus Augustin): No MR evidence for discitis osteomyelitis as clinically questioned  Left paracentral disc extrusion at L5-S1 posteriorly displaces and impinges the descending left S1 nerve root   Moderate to severe bilateral foraminal narrowing is present at this level with contact of the exiting L5 nerve roots  Degenerative changes in the remainder of the lumbar spine as described above      Unclear infectious source, UA negative, BCx NG24, no GI sx, CXR clear    Plan:   Repeat labs this morning w/ increasing WBC to 17 again   Patient now hypertensive to 160s (no indication for continuing fluid at this time)   MRI negative for diskitis, osteomyelitis, epidural abscess   F/u blood cultures   May need to reconsider antibiotics

## 2021-03-08 PROBLEM — E87.6 HYPOKALEMIA: Status: RESOLVED | Noted: 2021-03-07 | Resolved: 2021-03-08

## 2021-03-08 PROBLEM — A41.9 SEPSIS (HCC): Status: RESOLVED | Noted: 2021-03-07 | Resolved: 2021-03-08

## 2021-03-08 LAB
ANION GAP SERPL CALCULATED.3IONS-SCNC: 4 MMOL/L (ref 4–13)
BASOPHILS # BLD AUTO: 0.02 THOUSANDS/ΜL (ref 0–0.1)
BASOPHILS NFR BLD AUTO: 0 % (ref 0–1)
BUN SERPL-MCNC: 13 MG/DL (ref 5–25)
CALCIUM SERPL-MCNC: 9.1 MG/DL (ref 8.3–10.1)
CHLORIDE SERPL-SCNC: 105 MMOL/L (ref 100–108)
CO2 SERPL-SCNC: 27 MMOL/L (ref 21–32)
CREAT SERPL-MCNC: 0.82 MG/DL (ref 0.6–1.3)
EOSINOPHIL # BLD AUTO: 0.1 THOUSAND/ΜL (ref 0–0.61)
EOSINOPHIL NFR BLD AUTO: 1 % (ref 0–6)
ERYTHROCYTE [DISTWIDTH] IN BLOOD BY AUTOMATED COUNT: 13.6 % (ref 11.6–15.1)
GFR SERPL CREATININE-BSD FRML MDRD: 103 ML/MIN/1.73SQ M
GLUCOSE SERPL-MCNC: 129 MG/DL (ref 65–140)
HCT VFR BLD AUTO: 46.7 % (ref 36.5–49.3)
HGB BLD-MCNC: 15.4 G/DL (ref 12–17)
IMM GRANULOCYTES # BLD AUTO: 0.05 THOUSAND/UL (ref 0–0.2)
IMM GRANULOCYTES NFR BLD AUTO: 0 % (ref 0–2)
LACTATE SERPL-SCNC: 1.6 MMOL/L (ref 0.5–2)
LYMPHOCYTES # BLD AUTO: 3.24 THOUSANDS/ΜL (ref 0.6–4.47)
LYMPHOCYTES NFR BLD AUTO: 29 % (ref 14–44)
MAGNESIUM SERPL-MCNC: 2.3 MG/DL (ref 1.6–2.6)
MCH RBC QN AUTO: 29.6 PG (ref 26.8–34.3)
MCHC RBC AUTO-ENTMCNC: 33 G/DL (ref 31.4–37.4)
MCV RBC AUTO: 90 FL (ref 82–98)
MONOCYTES # BLD AUTO: 0.57 THOUSAND/ΜL (ref 0.17–1.22)
MONOCYTES NFR BLD AUTO: 5 % (ref 4–12)
NEUTROPHILS # BLD AUTO: 7.16 THOUSANDS/ΜL (ref 1.85–7.62)
NEUTS SEG NFR BLD AUTO: 65 % (ref 43–75)
NRBC BLD AUTO-RTO: 0 /100 WBCS
PLATELET # BLD AUTO: 341 THOUSANDS/UL (ref 149–390)
PMV BLD AUTO: 8.8 FL (ref 8.9–12.7)
POTASSIUM SERPL-SCNC: 3.8 MMOL/L (ref 3.5–5.3)
RBC # BLD AUTO: 5.2 MILLION/UL (ref 3.88–5.62)
SODIUM SERPL-SCNC: 136 MMOL/L (ref 136–145)
WBC # BLD AUTO: 11.14 THOUSAND/UL (ref 4.31–10.16)

## 2021-03-08 PROCEDURE — 83735 ASSAY OF MAGNESIUM: CPT | Performed by: PSYCHIATRY & NEUROLOGY

## 2021-03-08 PROCEDURE — A9585 GADOBUTROL INJECTION: HCPCS | Performed by: STUDENT IN AN ORGANIZED HEALTH CARE EDUCATION/TRAINING PROGRAM

## 2021-03-08 PROCEDURE — 83605 ASSAY OF LACTIC ACID: CPT | Performed by: PSYCHIATRY & NEUROLOGY

## 2021-03-08 PROCEDURE — 97166 OT EVAL MOD COMPLEX 45 MIN: CPT

## 2021-03-08 PROCEDURE — 80048 BASIC METABOLIC PNL TOTAL CA: CPT | Performed by: PSYCHIATRY & NEUROLOGY

## 2021-03-08 PROCEDURE — 97163 PT EVAL HIGH COMPLEX 45 MIN: CPT

## 2021-03-08 PROCEDURE — 99244 OFF/OP CNSLTJ NEW/EST MOD 40: CPT | Performed by: NEUROLOGICAL SURGERY

## 2021-03-08 PROCEDURE — 85025 COMPLETE CBC W/AUTO DIFF WBC: CPT | Performed by: PSYCHIATRY & NEUROLOGY

## 2021-03-08 RX ORDER — IBUPROFEN 400 MG/1
800 TABLET ORAL EVERY 8 HOURS PRN
Status: DISCONTINUED | OUTPATIENT
Start: 2021-03-08 | End: 2021-03-09

## 2021-03-08 RX ADMIN — IBUPROFEN 800 MG: 400 TABLET ORAL at 15:02

## 2021-03-08 RX ADMIN — ENOXAPARIN SODIUM 40 MG: 40 INJECTION SUBCUTANEOUS at 08:38

## 2021-03-08 RX ADMIN — GADOBUTROL 7 ML: 604.72 INJECTION INTRAVENOUS at 00:22

## 2021-03-08 RX ADMIN — METHOCARBAMOL 500 MG: 500 TABLET, FILM COATED ORAL at 03:45

## 2021-03-08 RX ADMIN — ACETAMINOPHEN 650 MG: 325 TABLET ORAL at 23:26

## 2021-03-08 RX ADMIN — METHOCARBAMOL 500 MG: 500 TABLET, FILM COATED ORAL at 23:26

## 2021-03-08 RX ADMIN — ACETAMINOPHEN 650 MG: 325 TABLET ORAL at 07:45

## 2021-03-08 RX ADMIN — IBUPROFEN 400 MG: 400 TABLET ORAL at 09:57

## 2021-03-08 RX ADMIN — POLYETHYLENE GLYCOL 3350 17 G: 17 POWDER, FOR SOLUTION ORAL at 08:38

## 2021-03-08 RX ADMIN — ACETAMINOPHEN 650 MG: 325 TABLET ORAL at 17:43

## 2021-03-08 RX ADMIN — METHOCARBAMOL 500 MG: 500 TABLET, FILM COATED ORAL at 09:57

## 2021-03-08 RX ADMIN — IBUPROFEN 400 MG: 400 TABLET ORAL at 03:45

## 2021-03-08 RX ADMIN — IBUPROFEN 800 MG: 400 TABLET ORAL at 22:32

## 2021-03-08 RX ADMIN — NICOTINE 1 PATCH: 21 PATCH, EXTENDED RELEASE TRANSDERMAL at 08:38

## 2021-03-08 RX ADMIN — METHOCARBAMOL 500 MG: 500 TABLET, FILM COATED ORAL at 17:43

## 2021-03-08 NOTE — ASSESSMENT & PLAN NOTE
Patient presents with 10 day history of back pain with left leg radiation, difficulties with urination  · History of recent crystal meth use, just prior to arrival    Imaging reviewed personally and with attending, results are as follows:  · MRI lumbar spine w wo contrast 3/7/2021:  No MR evidence for discitis osteomyelitis as clinically questioned  Left paracentral disc extrusion at L5-S1 posteriorly displaces and impinges the descending left S1 nerve root  Moderate to severe bilateral foraminal narrowing is present at this level with contact of the exiting L5 nerve roots  Degenerative changes in the remainder of the lumbar spine as described above  Plan:   Exam with subjective radicular symptoms down the left lateral aspect of the leg, some urinary retention PTA but states this is improving, no weakness, saddle anesthesia noted   Ongoing discussion regarding possible need for surgical intervention on this admission  o Continue with multimodal regimen, can trial medrol dose pack  o If no surgery, can trial ACOSTA and PT in the outpatient setting   Medical management and pain control per primary team   DVT ppx:  SCDs, lovenox   Mobilize as tolerated with assistance, PT / OT evaluation    Neurosurgery will continue to follow  Please call with questions or concerns

## 2021-03-08 NOTE — ASSESSMENT & PLAN NOTE
Patient with one week history of left sided sciatica symptoms with low back pain  · Uses iv methamphetamine with tap water- last use 1 day ago  · Also complaining of urinary retention and erectile dysfunctions x 2-3 months  · CT lumbar spine w/ moderate L5-S1 bilateral foraminal narrowing  · MRI demonstrated no osteomyelitis, diskitis, epidural abscess, but left paracentral disc extrusion at L5-S1 posteriorly displaces and impinges on S1 nerve root    Plan:  · Consulted neurosurgery, appreciate recs  · ACOSTA today  · Patient stable for d/c following procedure per Dr Manley Level  · Robaxin for muscle spasms  · Tylenol and ibuprofen for pain  · Lumbar support brace on discharge  · Medrol dose zelda on discharge

## 2021-03-08 NOTE — H&P
INTERNAL MEDICINE RESIDENCY ADMISSION H&P     Name: Kristen Vazquez   Age & Sex: 48 y o  male   MRN: [id number]  Unit/Bed#: -01   Encounter: [id number]  Primary Care Provider: No primary care provider on file  Code Status: Level 1 - Full Code  Admission Status: INPATIENT   Disposition: Patient requires Med/Surg    Admit to team: SOD Team B     ASSESSMENT/PLAN     Principal Problem:    Acute left-sided low back pain with sciatica  Active Problems:    Sepsis ([facility] [address] )    Hypokalemia    Methamphetamine use ([facility])      * Acute left-sided low back pain with sciatica  Assessment & Plan  · Patient with one week history of left sided sciatica symptoms with low back pain  · Uses iv methamphetamine with tap water- last use 1 day ago  · Also complaining of urinary retention   · CT lumbar spine obtained at [facility]  · MRI lumbar spine ordered here (patient transferred for the same)  · If patient has any findings on his MRI, will consult neurosurgery  · Robaxin for muscle spasms  · Tylenol and ibuprofen for pain    [facility]eth[facility] ([facility] )  Assessment & Plan  Reports IV use of methamphetamine mixed with tap water  Urine screen positive  Reported to ED that he used yesterday but not today  - At risk for bacteremia, SEA, endocarditis    Hypokalemia  Assessment & Plan  Potassium of 3 1 on presentation  - Received 40mEq PO in ED  - Recheck in AM    Sepsis [facility])  Assessment & Plan  Patient meets sepsis criteria with leukocytosis and lactic acidosis  Given active IV drug use as well as new neurologic symptoms as detailed above, there is concern for possible spinal epidural abscess  -  received cefepime 2 mg prior to transfer  - received 2 L IV fluid boluses  - stat MRI approved by Radiology (Dr Hina Last)  - follow lactate        VTE Pharmacologic Prophylaxis: Enoxaparin (Lovenox)  VTE Mechanical Prophylaxis: sequential compression device    CHIEF COMPLAINT   No chief complaint on file       HISTORY OF PRESENT ILLNESS     Patient is a 30-year-old male with a past medical history of IV methamphetamine use, tobacco abuse and a remote history of IV opiate use in remission who presents with one week history of lower back pain radiating to his left leg and one day history of urinary retention  He said he is having a very hard time peeing since yesterday  He denies perianal loss of sensation and fecal incontinence but does say that he hasn't had a bowel movement in a few days  Has sciatica symptoms all the time currently and getting worse since a week  No h/o trauma  Smoke a pack per day, uses iv methamphetamine, history of opioid use in remission, non-alcoholic  Patient presented to [address] [place] [address] with these symptoms and was transferred to [place] for a stat MRI  REVIEW OF SYSTEMS     Review of Systems   Genitourinary: Positive for difficulty urinating  Musculoskeletal: Positive for back pain  Neurological: Negative for weakness and numbness  All other systems reviewed and are negative  OBJECTIVE     Vitals:    21   Weight: 70 3 kg (155 lb)   Height: 5' 8" (1 727 m)      Temperature:   Temp (24hrs), Av 9 °F (36 6 °C), Min:97 9 °F (36 6 °C), Max:97 9 °F (36 6 °C)       Intake & Output:  I/O        07 -  0700 701 -  0700          Unmeasured Urine Occurrence  1 x        Weights:   IBW: 68 4 kg    Body mass index is 23 57 kg/m²  Weight (last 2 days)     Date/Time   Weight    21   70 3 (155)            Physical Exam  Constitutional:       Appearance: He is normal weight  HENT:      Head: Normocephalic and atraumatic  Mouth/Throat:      Mouth: Mucous membranes are moist    Eyes:      Pupils: Pupils are equal, round, and reactive to light  Cardiovascular:      Rate and Rhythm: Normal rate and regular rhythm  Pulses: Normal pulses  Heart sounds: Normal heart sounds     Pulmonary:      Effort: Pulmonary effort is normal       Breath sounds: Normal breath sounds  Abdominal:      General: Abdomen is flat  Palpations: Abdomen is soft  Musculoskeletal: Normal range of motion  Skin:     General: Skin is warm  Neurological:      General: No focal deficit present  Mental Status: He is alert and oriented to person, place, and time  Psychiatric:         Mood and Affect: Mood normal        PAST MEDICAL HISTORY     Past Medical History:   Diagnosis Date    Hyperlipidemia     Hypertension      PAST SURGICAL HISTORY     Past Surgical History:   Procedure Laterality Date    FEMUR FRACTURE SURGERY Right 1985     SOCIAL & FAMILY HISTORY     Social History     Substance and Sexual Activity   Alcohol Use Never    Frequency: Never     Social History     Substance and Sexual Activity   Drug Use Yes    Types: Methamphetamines    Comment: IV meth     Social History     Tobacco Use   Smoking Status Current Every Day Smoker    Packs/day: 1 00   Smokeless Tobacco Never Used     Family History   Problem Relation Age of Onset    Heart disease Mother     Cancer Father      LABORATORY DATA     Labs: I have personally reviewed pertinent reports  Results from last 7 days   Lab Units 03/07/21  1716   WBC Thousand/uL 17 40*   HEMOGLOBIN g/dL 16 8   HEMATOCRIT % 50 5*   PLATELETS Thousands/uL 419*   NEUTROS PCT % 70   MONOS PCT % 6      Results from last 7 days   Lab Units 03/07/21  1716   POTASSIUM mmol/L 3 1*   CHLORIDE mmol/L 93*   CO2 mmol/L 32*   BUN mg/dL 18   CREATININE mg/dL 0 92   CALCIUM mg/dL 10 1   ALK PHOS U/L 89   ALT U/L 19   AST U/L 17                  Results from last 7 days   Lab Units 03/07/21  1716   LACTIC ACID mmol/L 2 2*         Micro:  No results found for: BLOODCX, Lin Sportsman, WOUNDCULT, SPUTUMCULTUR  IMAGING & DIAGNOSTIC TESTS     Imaging: I have personally reviewed pertinent reports  Ct Spine Lumbar Without Contrast    Result Date: 3/7/2021  Impression: No CT signs of discitis/osteomyelitis   Disc disease L4-L5 and L5-S1  Moderate bilateral neural foraminal narrowing at L5-S1  Workstation performed: [id number]     EKG, Pathology, and Other Studies: I have personally reviewed pertinent reports  ALLERGIES   No Known Allergies  MEDICATIONS PRIOR TO ARRIVAL     Prior to Admission medications    Medication Sig Start Date End Date Taking?  Authorizing Provider   acetaminophen (TYLENOL) 500 mg tablet Take 1,000 mg by mouth every 6 (six) hours as needed for mild pain   Yes Historical Provider, MD   lisinopril (ZESTRIL) 40 mg tablet Take 20 mg by mouth 2 (two) times a day   Yes Historical Provider, MD   Multiple Vitamins-Minerals (multivitamin with minerals) tablet Take 1 tablet by mouth daily   Yes Historical Provider, MD   ibuprofen (MOTRIN) 800 mg tablet Take 1 tablet (800 mg total) by mouth 3 (three) times a day  Patient taking differently: Take 800 mg by mouth every 8 (eight) hours as needed  9/8/20   Sade Expose, DO   methocarbamol (ROBAXIN) 500 mg tablet Take 2 tablets (1,000 mg total) by mouth 2 (two) times a day  Patient not taking: Reported on 3/7/2021 9/8/20   Sade Expose, DO     MEDICATIONS ADMINISTERED IN LAST 24 HOURS     Medication Administration - last 24 hours from 03/06/2021 2121 to 03/07/2021 2121       Date/Time Order Dose Route Action Action by     03/07/2021 2115 ibuprofen (MOTRIN) tablet 400 mg 400 mg Oral Given Chai Kruse RN     03/07/2021 2115 methocarbamol (ROBAXIN) tablet 500 mg 500 mg Oral Given Chai Kruse RN        CURRENT MEDICATIONS     Current Facility-Administered Medications   Medication Dose Route Frequency Provider Last Rate    acetaminophen  650 mg Oral Q6H PRN MD Valentina Bonillaiahany Blunt ON 3/8/2021] enoxaparin  40 mg Subcutaneous Daily Juan A Jones MD      ibuprofen  400 mg Oral Q6H PRN Juan A Jones MD      methocarbamol  500 mg Oral Q6H PRN Juan A Jones MD      [START ON 3/8/2021] nicotine  1 patch Transdermal Daily Juan A Jones MD acetaminophen, 650 mg, Q6H PRN  ibuprofen, 400 mg, Q6H PRN  methocarbamol, 500 mg, Q6H PRN        Admission Time  I spent 30 minutes admitting the patient  This involved direct patient contact where I performed a full history and physical, reviewing previous records, and reviewing laboratory and other diagnostic studies  Portions of the record may have been created with voice recognition software  Occasional wrong word or "sound a like" substitutions may have occurred due to the inherent limitations of voice recognition software    Read the chart carefully and recognize, using context, where substitutions have occurred     ==  Nelson Boykin MD  [facility]  Internal Medicine Residency PGY-3

## 2021-03-08 NOTE — DISCHARGE INSTR - AVS FIRST PAGE
Dear Jeny Sequeira,     It was our pleasure to care for you here at [facility]  It is our hope that we were always able to exceed the expected standards for your care during your stay  You were hospitalized due to back pain with findings on your MRI demonstrating some compression of one of your nerve roots, likely the underlying cause for your pain (this type of pain we call radiculopathy)  You received an epidural steroid injection with Dr Tonya Abbott PATIENTS [facility]) this morning to help with your symptoms  You were cared for on the 4th floor by Linsey Burns MD under the service of Cinthia Marx MD with the [facility] who covers for your primary care physician (PCP), No primary care provider on file  , while you were hospitalized  If you have any questions or concerns related to this hospitalization, you may contact us at [phone]  For follow up as well as any medication refills, we recommend that you follow up with your primary care physician  A registered nurse will reach out to you by phone within a few days after your discharge to answer any additional questions that you may have after going home    However, at this time we provide for you here, the most important instructions / recommendations at discharge:     · Notable Medication Adjustments -   · Please start taking Norvasc/amlodipine 5 mg daily for your hypertension  · Please start a Medrol dose zelda (this is a steroid taper that may help reduce some of the inflammation that may be exacerbating your back pain)  · You may use tylenol, robaxin and ibuprofen as needed (these prescriptions have been sent to the [address] [place] [address])  · Testing Required after Discharge -   · Per PCP and neurosurgery  · Important follow up information -   · Please follow up with your PCP as soon as possible  · Please follow up with neurosurgery as an outpatient and they will continue to monitor your back pain as well as provide additional epidural steroid injections for pain relief  · Other Instructions -   · Please use any provided materials regarding substance abuse if you would like help with quitting  · You may try conservative measures for your back pain first including physical therapy, heating pads, lumbar support brace  · Please review this entire after visit summary as additional general instructions including medication list, appointments, activity, diet, any pertinent wound care, and other additional recommendations from your care team that may be provided for you        Sincerely,     Evelia Tay MD

## 2021-03-08 NOTE — PHYSICAL THERAPY NOTE
Physical Therapy evaluation     Patient Name: Cortney Raygoza    KDZBD'C Date: 3/8/2021     Problem List  Principal Problem:    Acute left-sided low back pain with sciatica  Active Problems:    Methamphetamine use ([address] )    Hypertension       Past Medical History  Past Medical History:   Diagnosis Date    Hyperlipidemia     Hypertension         Past Surgical History  Past Surgical History:   Procedure Laterality Date    FEMUR FRACTURE SURGERY Right 1985 03/08/21 1108   PT Last Visit   PT Visit Date 03/08/21   Note Type   Note type Evaluation   Pain Assessment   Pain Assessment Tool 0-10   Pain Score 7   Pain Location/Orientation Orientation: Left; Location: Buttocks; Location: Leg   Hospital Pain Intervention(s) Repositioned; Ambulation/increased activity; Elevated; Emotional support   Multiple Pain Sites No   Home Living   Type of [address] One level;Stairs to enter with rails   Bathroom Shower/Tub Tub/shower unit   Bathroom Toilet Standard   Bathroom Accessibility Accessible   Home Equipment   (none )   Prior Function   Level of [place] Independent with ADLs and functional mobility   Lives With Significant other   Receives Help From Family   ADL Assistance Independent   IADLs Needs assistance   Falls in the last 6 months 1 to 4   Vocational Full time employment  (shannan )   Comments I w/o AD PTA   Restrictions/Precautions   Weight Bearing Precautions Per Order No   Braces or Orthoses   (none )   Other Precautions Fall Risk;Pain;Multiple lines; Bed Alarm; Chair Alarm; Impulsive;Agitated;Spinal precautions  (SPINAL PRECAUTIONS FOLLOWED THROUGHOUT )   Cognition   Overall Cognitive Status WFL   Orientation Level Oriented X4   Comments impulsive    RUE Assessment   RUE Assessment WFL   LUE Assessment   LUE Assessment WFL   RLE Assessment   RLE Assessment WFL   LLE Assessment   LLE Assessment WFL  (functional assessment only - pt resisting 2* pain- 5/5 ankle)   Coordination   Movements are Fluid and Coordinated   (antalgic )   Light Touch   RLE Light Touch Grossly intact   LLE Light Touch Grossly intact   Bed Mobility   Supine to Sit 5  Supervision   Transfers   Sit to Stand 5  Supervision   Additional items Impulsive   Stand to Sit 5  Supervision   Additional items Impulsive   Stand pivot 5  Supervision   Additional items Impulsive   Ambulation/Elevation   Gait pattern Decreased L stance; Antalgic   Gait Assistance 5  Supervision   Assistive Device None   Distance limited to in room per pt- <20' self limited    Stair Management Assistance   (declines 2* pain )   Balance   Static Sitting Fair +   Dynamic Sitting Fair +   Static Standing Fair   Dynamic Standing Fair   Ambulatory Fair  (W/O ad PT DECLINING TRIAL )   Activity Tolerance   Activity Tolerance Patient limited by pain   Assessment   Prognosis Fair   Problem List Decreased endurance; Impaired balance;Decreased mobility; Impaired judgement;Decreased safety awareness;Pain;Orthopedic restrictions   Assessment Pt is 48 y o  male seen for PT evaluation s/p admit to [facility] on 3/7/2021 w/ acute L LBP w/ sciatica  MRI showing  L paracentral disc extrusion at L5-S1 posteriorly displacing and impinging on L S1 nerve root  Current dx/ problem list includes: hx of IVDA/ injected meth day prior to admission; tobacco abuse  Orders for PT OT ambulate as tolerated  Personal factors affecting pt at time of IE include: steps to enter environment, limited home support, poor safety/ judgement; IVDA    Due to acute medical issues, pending neuro sx plan ongoing medical workup for primary dx; pain, fall risk, increased reliance on more restrictive AD compared to baseline;  decreased activity tolerance compared to baseline, decline in overall functional mobility status; health management issues; note unstable clinical picture (high complexity)   Prior to admission, pt reports being fully indep and ambulating w/o AD; working FT as a shannan; lives in 2nd fl apt w/ FF to enter 0 AD I w/ ADL's IADLs   Currently pt  presents impulsive- not using AD for ambulation in room- limited by pain and behavioral barriers- poor sitting tolerance; but is able to mobilize w/o AD level surfaces is not receptive to use of RW on eval reports 10/10 pain in low back and L leg  Pt presents functioning below baseline and currently w/ overall mobility deficits 2* to: pain in back and Lleg; limited flexibility;  decreased endurance; decreased activity tolerance; gait deviations;  impaired safety and judgement (Please find additional objective findings from PT assessment regarding body systems outlined above ) Pt will continue to benefit from skilled PT interventions to address stated impairments; to maximize functional potential; for ongoing pt/ family training; and DME needs  PT is currently recommending d/c to home w/ family support OPPT when medically cleared and pain controlled- PT will follow for progressive gait training and goals as outlined on IE while in inpt   Barriers to Discharge Comments PAIN   Goals   Patient Goals LESS PAIN    STG Expiration Date 03/18/21   Short Term Goal #1 In 10 days pt will complete: 1) Bed mobility skills with indep to facilitate safe return to previous living environment 2) Functional transfers with indep  to facilitate safe return to previous living environment  3) Ambulation with least restrictive AD > 300 indep w/ < 3/10 pain ' without LOB and stable vitals for safe ambulation home/ community distances  4) Stair training up/ down FF step/s with 1 rail/s  and indep for safe access to previous living environment  5) Improve balance grades to Good 6) Improve LE strength grades by 1 to increase independence w/ transfers and gait  7) LE HEP independently  8) PT for ongoing pt and family education; DME needs and D/C planning to promote highest level of function in least restrictive environment  PT Treatment Day 0   Plan   Treatment/Interventions Functional transfer training;LE strengthening/ROM; Elevations; Therapeutic exercise; Endurance training;Patient/family training;Equipment eval/education; Bed mobility;Gait training; Compensatory technique education;Spoke to nursing;Spoke to case management;OT   PT Frequency   (3-5X/WK )   Recommendation   PT Discharge Recommendation Other (Comment)  (oppt)   Equipment Recommended   (PT DECLINING TRIAL/ USE - TBD )   Additional Comments UP IN RECLINER W/ ALL NEEDS IN REACH POST SESSION    AM-PAC Basic Mobility Inpatient   Turning in Bed Without Bedrails 4   Lying on Back to Sitting on Edge of Flat Bed 4   Moving Bed to Chair 4   Standing Up From Chair 4   Walk in Room 4   Climb 3-5 Stairs 3   Basic Mobility Inpatient Raw Score 23   Basic Mobility Standardized Score 50 88   The patient's AM-PAC Basic Mobility Inpatient Short Form Raw Score is 23, Standardized Score is 50  88  A standardized score greater than 42 9 suggests the patient may benefit from discharge to home  Please also refer to the recommendation of the Physical Therapist for safe discharge planning        Victoria Alfred, PT

## 2021-03-08 NOTE — CONSULTS
Addendum:  Spoke to attendings regarding patient, exam findings and imaging  It is felt at this time that patient should exhaust conservative management including multimodal regimen, steroid pack, outpatient PT and ACOSTA  He is to return to the office in a few weeks should he fail conservative management or get new or worsening symptoms to discuss possible surgical intervention  Signed off, please call with questions or concerns  Consult- Sabrina Dinh 1970, 48 y o  male MRN: [id number]    Unit/Bed#: -[facility] Encounter: [id number]    Primary Care Provider: No primary care provider on file  Date and time admitted to hospital: 3/7/2021  7:40 PM      Inpatient consult to Neurosurgery  Consult performed by: Elda Howell PA-C  Consult ordered by: Sangeetha Valentine MD      consult completed on 3/8/2021 at 1145    * Acute left-sided low back pain with sciatica  Assessment & Plan  Patient presents with 10 day history of back pain with left leg radiation, difficulties with urination  · History of recent crystal meth use, just prior to arrival    Imaging reviewed personally and with attending, results are as follows:  · MRI lumbar spine w wo contrast 3/7/2021:  No MR evidence for discitis osteomyelitis as clinically questioned  Left paracentral disc extrusion at L5-S1 posteriorly displaces and impinges the descending left S1 nerve root  Moderate to severe bilateral foraminal narrowing is present at this level with contact of the exiting L5 nerve roots  Degenerative changes in the remainder of the lumbar spine as described above  Plan:   Exam with subjective radicular symptoms down the left lateral aspect of the leg, some urinary retention PTA but states this is improving, no weakness, saddle anesthesia noted     Ongoing discussion regarding possible need for surgical intervention on this admission  o Continue with multimodal regimen, can trial medrol dose pack  o If no surgery, can trial ACOTSA and PT in the outpatient setting   Medical management and pain control per primary team   DVT ppx:  SCDs, lovenox   Mobilize as tolerated with assistance, PT / OT evaluation    Neurosurgery will continue to follow  Please call with questions or concerns  Methamphetamine use ([facility] [address] )  Assessment & Plan  · Per patient, crystal meth was used just PTA  · Per report, patient has history of IVDA abuse with meth and opiates as well as tobacco abuse (1 pack per day for about 40 years)      History of Present Illness   HPI: Jeny Sequeira is a 48y o  year old male with PMH including IVDA with recent meth use just PTA, tobacco abuse who presents with complaint of back pain with radicular symptoms x 10 days as well as some difficulty urinating  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  States about 10 days ago he noted some back patient with radiation down the entire lateral aspect of his leg into the bottom of the foot  Pain is constant, waxing and waning in intensity depending on what he is doing  Laying in better  Sitting is worse  Pain is dull and achy  He has some occassional numbness in the bottom of his foot  He reports no true weakness in his legs  He had some trouble with urinating which ultimately brought him to the hospital, reporting he has to push to get his urine out  No issues with his bowels or saddle anesthesia  He has been using ibuprofen with some relief and some of his girlfriends gabapentin but does not know if this did anything for him  Of note, he reports similar symptoms in right leg over the summer but states this resolved on its own  Upon presentation, there was concern he may have an infection in his back given his drug abuse history however MRI demonstrated no osteo / discitis, only some nerve compression most notable at L4-5 and L5-S1  Review of Systems   Constitutional: Negative for chills and fever     HENT: Negative for hearing loss and trouble swallowing  Eyes: Negative for visual disturbance  Respiratory: Negative for chest tightness and shortness of breath  Cardiovascular: Negative for chest pain  Gastrointestinal: Negative for abdominal pain, constipation, diarrhea, nausea and vomiting  Genitourinary: Positive for difficulty urinating (mild urinary retention)  Musculoskeletal: Positive for back pain (radiation into LLE)  Negative for neck pain  Skin: Negative for wound  Neurological: Negative for dizziness, facial asymmetry, speech difficulty, weakness, numbness and headaches  Hematological: Does not bruise/bleed easily  Psychiatric/Behavioral: Negative for confusion         Historical Information   Past Medical History:   Diagnosis Date    Hyperlipidemia     Hypertension      Past Surgical History:   Procedure Laterality Date    FEMUR FRACTURE SURGERY Right 1985     Social History     Substance and Sexual Activity   Alcohol Use Never    Frequency: Never     Social History     Substance and Sexual Activity   Drug Use Yes    Types: Methamphetamines    Comment: IV meth     Social History     Tobacco Use   Smoking Status Current Every Day Smoker    Packs/day: 1 00   Smokeless Tobacco Never Used     Family History   Problem Relation Age of Onset    Heart disease Mother     Cancer Father        Meds/Allergies   all current active meds have been reviewed, current meds:   Current Facility-Administered Medications   Medication Dose Route Frequency    acetaminophen (TYLENOL) tablet 650 mg  650 mg Oral Q6H PRN    enoxaparin (LOVENOX) subcutaneous injection 40 mg  40 mg Subcutaneous Daily    ibuprofen (MOTRIN) tablet 800 mg  800 mg Oral Q8H PRN    methocarbamol (ROBAXIN) tablet 500 mg  500 mg Oral Q6H PRN    nicotine (NICODERM CQ) 21 mg/24 hr TD 24 hr patch 1 patch  1 patch Transdermal Daily    pneumococcal 23-valent polysaccharide vaccine (PNEUMOVAX-23) injection 0 5 mL  0 5 mL Subcutaneous Prior to discharge    polyethylene glycol (MIRALAX) packet 17 g  17 g Oral Daily    and PTA meds:   Prior to Admission Medications   Prescriptions Last Dose Informant Patient Reported? Taking? Multiple Vitamins-Minerals (multivitamin with minerals) tablet   Yes Yes   Sig: Take 1 tablet by mouth daily   acetaminophen (TYLENOL) 500 mg tablet   Yes Yes   Sig: Take 1,000 mg by mouth every 6 (six) hours as needed for mild pain   ibuprofen (MOTRIN) 800 mg tablet   No No   Sig: Take 1 tablet (800 mg total) by mouth 3 (three) times a day   Patient taking differently: Take 800 mg by mouth every 8 (eight) hours as needed    lisinopril (ZESTRIL) 40 mg tablet   Yes Yes   Sig: Take 20 mg by mouth 2 (two) times a day   methocarbamol (ROBAXIN) 500 mg tablet Not Taking at Unknown time  No No   Sig: Take 2 tablets (1,000 mg total) by mouth 2 (two) times a day   Patient not taking: Reported on 3/7/2021      Facility-Administered Medications: None     No Known Allergies    Objective   I/O       03/06 0701 - 03/07 0700 03/07 0701 - 03/08 0700 03/08 0701 - 03/09 0700    P  O   880 200    Total Intake(mL/kg)  880 (12 5) 200 (2 8)    Net  +880 +200           Unmeasured Urine Occurrence  3 x 2 x    Unmeasured Stool Occurrence   2 x          Physical Exam  Constitutional:       General: He is awake  Appearance: Normal appearance  HENT:      Head: Normocephalic and atraumatic  Eyes:      Extraocular Movements: Extraocular movements intact and EOM normal       Conjunctiva/sclera: Conjunctivae normal    Neck:      Musculoskeletal: No spinous process tenderness or muscular tenderness  Cardiovascular:      Rate and Rhythm: Regular rhythm  Pulmonary:      Effort: Pulmonary effort is normal  No respiratory distress  Musculoskeletal:      Cervical back: He exhibits no tenderness  Thoracic back: He exhibits no tenderness  Lumbar back: He exhibits tenderness (left SI / buttock region)  Skin:     General: Skin is warm and dry     Neurological: Mental Status: He is alert and oriented to person, place, and time  Coordination: Finger-Nose-Finger Test normal       Deep Tendon Reflexes: Strength normal    Psychiatric:         Attention and Perception: Attention and perception normal          Mood and Affect: Mood and affect normal          Speech: Speech normal          Behavior: Behavior normal  Behavior is cooperative  Thought Content: Thought content normal          Cognition and Memory: Cognition and memory normal          Judgment: Judgment normal        Neurologic Exam     Mental Status   Oriented to person, place, and time  Follows 1 step commands  Attention: normal  Concentration: normal    Speech: speech is normal   Level of consciousness: alert  Knowledge: good  Able to perform simple calculations  Able to repeat  Normal comprehension  Cranial Nerves     CN III, IV, VI   Extraocular motions are normal    CN III: no CN III palsy  CN VI: no CN VI palsy  Nystagmus: none   Diplopia: none  Ophthalmoparesis: none  Upgaze: normal  Downgaze: normal  Conjugate gaze: present    CN V   Right facial sensation deficit: none  Left facial sensation deficit: none    CN VII   Right facial weakness: none  Left facial weakness: none    CN VIII   Hearing: intact    CN IX, X   CN IX normal    CN X normal      CN XI   Right trapezius strength: normal  Left trapezius strength: normal    CN XII   CN XII normal      Motor Exam   Muscle bulk: normal  Overall muscle tone: normal  Right arm pronator drift: absent  Left arm pronator drift: absent    Strength   Strength 5/5 throughout       Sensory Exam   Light touch normal    Right leg proprioception: normal  Left leg proprioception: normal  DST intact     Gait, Coordination, and Reflexes     Coordination   Finger to nose coordination: normal    Tremor   Resting tremor: absent  Intention tremor: absent  Action tremor: absent    Reflexes   Right : 2+  Left : 2+  Right Barth: absent  Left Barth: absent  Right ankle clonus: absent  Left ankle clonus: absent      Vitals:Blood pressure (!) 160/102, pulse 84, temperature 97 9 °F (36 6 °C), resp  rate 18, height 5' 8" (1 727 m), weight 70 3 kg (155 lb), SpO2 98 %  ,Body mass index is 23 57 kg/m²  Lab Results:   Results from last 7 days   Lab Units 03/08/21  1126 03/07/21  2205 03/07/21  1716   WBC Thousand/uL 11 14*  --  17 40*   HEMOGLOBIN g/dL 15 4  --  16 8   HEMATOCRIT % 46 7  --  50 5*   PLATELETS Thousands/uL 341 330 419*   NEUTROS PCT % 65  --  70   MONOS PCT % 5  --  6     Results from last 7 days   Lab Units 03/08/21  1126 03/07/21  1716   POTASSIUM mmol/L 3 8 3 1*   CHLORIDE mmol/L 105 93*   CO2 mmol/L 27 32*   BUN mg/dL 13 18   CREATININE mg/dL 0 82 0 92   CALCIUM mg/dL 9 1 10 1   ALK PHOS U/L  --  89   ALT U/L  --  19   AST U/L  --  17       Imaging Studies: I have personally reviewed pertinent reports  and I have personally reviewed pertinent films in PACS    Xr Chest 1 View Portable    Result Date: 3/8/2021  Impression: No acute cardiopulmonary disease  Workstation performed: [id number]     Ct Spine Lumbar Without Contrast    Result Date: 3/7/2021  Impression: No CT signs of discitis/osteomyelitis  Disc disease L4-L5 and L5-S1  Moderate bilateral neural foraminal narrowing at L5-S1  Workstation performed: [id number]     Mri Lumbar Spine W Wo Contrast    Result Date: 3/8/2021  Impression: No MR evidence for discitis osteomyelitis as clinically questioned  Left paracentral disc extrusion at L5-S1 posteriorly displaces and impinges the descending left S1 nerve root  Moderate to severe bilateral foraminal narrowing is present at this level with contact of the exiting L5 nerve roots  Degenerative changes in the remainder of the lumbar spine as described above  Workstation performed: [id number]     EKG, Pathology, and Other Studies: I have personally reviewed pertinent reports        VTE Prophylaxis: Sequential compression device (Venodyne)  and Enoxaparin (Lovenox)    Code Status: Level 1 - Full Code  Advance Directive and Living Will:      Power of :    POLST:      Counseling / Coordination of Care  I spent 20 minutes with the patient

## 2021-03-08 NOTE — CASE MANAGEMENT
Pt is not a <30 day readmission or a bundle  Risk of unplanned readmission score is 8 (green)  Pt admitted due to low back pain with sciatica  Neurosurgery following for possible surgical intervention this admission  CM met with pt at bedside to introduce CM role and begin discharge planning  Pt lives with his significant other, Drea Paul ([phone]) in a 2nd floor apartment in a house that has steps to get to the 2nd floor  Pt reports being independent with ADLs PTA, no use of DME for ambulation  Pt reports no history of VNA, STR, or inpatient MH treatment  Pt reports that he is a current IV drug user and has a history of inpatient D/A treatment at [place] in 2014  Pt reports that he just finished an outpatient treatment program but also admits to using the day prior to hospital admission  CM reviewed HOST program with pt however pt declined stating that he does not feel he needs treatment at this time  Pt drives and works full time  Pt reports that he does not have a PCP, agreeable to [facility] phone number on [facility]  Pt uses [facility] in [address] [facility] for prescriptions  Pt's significant other or friends to assist with transportation at time of discharge  CM department to follow for discharge concerns or needs  CM reviewed d/c planning process including the following: identifying help at home, patient preference for d/c planning needs, Discharge Lounge, [facility] Meds to Bed program, availability of treatment team to discuss questions or concerns patient and/or family may have regarding understanding medications and recognizing signs and symptoms once discharged  CM also encouraged patient to follow up with all recommended appointments after discharge  Patient advised of importance for patient and family to participate in managing patients medical well being

## 2021-03-08 NOTE — ASSESSMENT & PLAN NOTE
· Per patient, crystal meth was used just PTA  · Per report, patient has history of IVDA abuse with meth and opiates as well as tobacco abuse (1 pack per day for about 40 years)

## 2021-03-08 NOTE — PROGRESS NOTES
INTERNAL MEDICINE RESIDENCY PROGRESS NOTE     Name: Juve Padgett   Age & Sex: 48 y o  male   MRN: [id number]  Unit/Bed#: -01   Encounter: [id number]  Team: SOD Team B     PATIENT INFORMATION     Name: Juve Padgett   Age & Sex: 48 y o  male   MRN: [id number]  Hospital Stay Days: 1    ASSESSMENT/PLAN     Principal Problem:    Acute left-sided low back pain with sciatica  Active Problems:    Sepsis ([facility] )    Hypokalemia    Methamphetamine use ([facility] )    Hypertension      * Acute left-sided low back pain with sciatica  Assessment & Plan  Patient with one week history of left sided sciatica symptoms with low back pain  · Uses iv methamphetamine with tap water- last use 1 day ago  · Also complaining of urinary retention and erectile dysfunctions x 2-3 months  · CT lumbar spine w/ moderate L5-S1 bilateral foraminal narrowing  · MRI demonstrated no osteomyelitis, diskitis, epidural abscess, but left paracentral disc extrusion at L5-S1 posteriorly displaces and impinges on S1 nerve root    Plan:  · Consulted neurosurgery, appreciate recs  · Robaxin for muscle spasms  · Tylenol and ibuprofen for pain    Hypertension  Assessment & Plan  Patient used to be on lisinopril, but has not had a refill for several months   Notes that this was problematic for him and that caused erectile dysfunction    Plan:   Trial amlodipine    Methamphetamine use ([facility] )  Assessment & Plan  Reports IV use of methamphetamine mixed with tap water  Urine screen positive  Reported to ED that he used yesterday but not today     At risk for bacteremia, SEA, endocarditis   CM consulted    Hypokalemia  Assessment & Plan  Potassium of 3 1 on presentation   Received 40mEq PO in ED   Recheck labs and replete prn    Sepsis [facility])  Assessment & Plan  Patient mets sepsis criteria on admission with leukocytosis and lactic acidosis   Given active IV drug use as well as new neurologic symptoms as detailed above, there is concern for possible spinal epidural abscess   Received cefepime 2 mg prior to transfer, 2 L IV fluid boluses   Stat MRI approved by Radiology (Dr Paola Castro): No MR evidence for discitis osteomyelitis as clinically questioned  Left paracentral disc extrusion at L5-S1 posteriorly displaces and impinges the descending left S1 nerve root   Moderate to severe bilateral foraminal narrowing is present at this level with contact of the exiting L5 nerve roots  Degenerative changes in the remainder of the lumbar spine as described above   Unclear infectious source     Plan:   Repeat labs this morning   Patient now hypertensive to 160s (no need for continuing fluid at this time)   MRI negative for diskitis, osteomyelitis, epidural abscess   If leukocytosis is persistent, restart abx   F/u blood cultures    Disposition: Discharge next 24-48 hrs pending neurosurgical consult    SUBJECTIVE     Patient seen and examined  No critical events overnight  Patient appears over all well but continues to have LBP and endorses persistent difficulty w/ urination  Denies CP, SOB, N/V/F/C  OBJECTIVE     Vitals:    21 0017 21 0711   BP:   (!) 158/101 (!) 160/102   Pulse:   81 84   Resp:   16 18   Temp:   97 8 °F (36 6 °C) 97 9 °F (36 6 °C)   SpO2:   98% 98%   Weight: 70 3 kg (155 lb) 70 3 kg (155 lb)     Height: 5' 8" (1 727 m) 5' 8" (1 727 m)        Temperature:   Temp (24hrs), Av 9 °F (36 6 °C), Min:97 8 °F (36 6 °C), Max:97 9 °F (36 6 °C)    Temperature: 97 9 °F (36 6 °C)  Intake & Output:  I/O       701 -  07 -  07 -  07    P  O   880 200    Total Intake(mL/kg)  880 (12 5) 200 (2 8)    Net  +880 +200           Unmeasured Urine Occurrence  3 x 2 x    Unmeasured Stool Occurrence   2 x        Weights:   IBW: 68 4 kg    Body mass index is 23 57 kg/m²    Weight (last 2 days)     Date/Time   Weight    21   70 3 (155)    21   70 3 (155) Physical Exam  Vitals signs reviewed  Constitutional:       General: He is not in acute distress  Appearance: Normal appearance  He is well-developed  He is not ill-appearing, toxic-appearing or diaphoretic  HENT:      Head: Normocephalic and atraumatic  Right Ear: External ear normal       Left Ear: External ear normal       Nose: Nose normal  No congestion or rhinorrhea  Mouth/Throat:      Mouth: Mucous membranes are moist       Pharynx: Oropharynx is clear  No oropharyngeal exudate or posterior oropharyngeal erythema  Eyes:      General: No scleral icterus  Extraocular Movements: Extraocular movements intact  Conjunctiva/sclera: Conjunctivae normal    Neck:      Trachea: No tracheal deviation  Cardiovascular:      Rate and Rhythm: Normal rate and regular rhythm  Heart sounds: Normal heart sounds  No murmur  No friction rub  No gallop  Pulmonary:      Effort: Pulmonary effort is normal  No respiratory distress  Breath sounds: Normal breath sounds  No stridor  No wheezing, rhonchi or rales  Abdominal:      General: Abdomen is flat  Bowel sounds are normal  There is no distension  Palpations: Abdomen is soft  Tenderness: There is no abdominal tenderness  There is no guarding or rebound  Musculoskeletal:         General: Tenderness (over L4-S2 midline spinal tenderness) present  Right lower leg: No edema  Left lower leg: No edema  Skin:     General: Skin is warm and dry  Coloration: Skin is not jaundiced or pale  Neurological:      General: No focal deficit present  Mental Status: He is alert and oriented to person, place, and time  Cranial Nerves: No cranial nerve deficit  Sensory: No sensory deficit  Motor: No weakness or abnormal muscle tone        Coordination: Coordination normal       Gait: Gait abnormal    Psychiatric:         Mood and Affect: Mood normal          Behavior: Behavior normal        LABORATORY DATA     Labs: I have personally reviewed pertinent reports  Results from last 7 days   Lab Units 03/07/21 2205 03/07/21  1716   WBC Thousand/uL  --  17 40*   HEMOGLOBIN g/dL  --  16 8   HEMATOCRIT %  --  50 5*   PLATELETS Thousands/uL 330 419*   NEUTROS PCT %  --  70   MONOS PCT %  --  6      Results from last 7 days   Lab Units 03/07/21  1716   POTASSIUM mmol/L 3 1*   CHLORIDE mmol/L 93*   CO2 mmol/L 32*   BUN mg/dL 18   CREATININE mg/dL 0 92   CALCIUM mg/dL 10 1   ALK PHOS U/L 89   ALT U/L 19   AST U/L 17                  Results from last 7 days   Lab Units 03/07/21  2205   LACTIC ACID mmol/L 1 3           IMAGING & DIAGNOSTIC TESTING     Radiology Results: I have personally reviewed pertinent reports  Xr Chest 1 View Portable    Result Date: 3/8/2021  Impression: No acute cardiopulmonary disease  Workstation performed: [id number]     Ct Spine Lumbar Without Contrast    Result Date: 3/7/2021  Impression: No CT signs of discitis/osteomyelitis  Disc disease L4-L5 and L5-S1  Moderate bilateral neural foraminal narrowing at L5-S1  Workstation performed: [id number]     Mri Lumbar Spine W Wo Contrast    Result Date: 3/8/2021  Impression: No MR evidence for discitis osteomyelitis as clinically questioned  Left paracentral disc extrusion at L5-S1 posteriorly displaces and impinges the descending left S1 nerve root  Moderate to severe bilateral foraminal narrowing is present at this level with contact of the exiting L5 nerve roots  Degenerative changes in the remainder of the lumbar spine as described above  Workstation performed: [id number]     Other Diagnostic Testing: I have personally reviewed pertinent reports      ACTIVE MEDICATIONS     Current Facility-Administered Medications   Medication Dose Route Frequency    acetaminophen (TYLENOL) tablet 650 mg  650 mg Oral Q6H PRN    enoxaparin (LOVENOX) subcutaneous injection 40 mg  40 mg Subcutaneous Daily    ibuprofen (MOTRIN) tablet 800 mg  800 mg Oral Q8H PRN    methocarbamol (ROBAXIN) tablet 500 mg  500 mg Oral Q6H PRN    nicotine (NICODERM CQ) 21 mg/24 hr TD 24 hr patch 1 patch  1 patch Transdermal Daily    pneumococcal 23-valent polysaccharide vaccine (PNEUMOVAX-23) injection 0 5 mL  0 5 mL Subcutaneous Prior to discharge    polyethylene glycol (MIRALAX) packet 17 g  17 g Oral Daily     VTE Pharmacologic Prophylaxis: Enoxaparin (Lovenox)  VTE Mechanical Prophylaxis: sequential compression device    Portions of the record may have been created with voice recognition software  Occasional wrong word or "sound a like" substitutions may have occurred due to the inherent limitations of voice recognition software    Read the chart carefully and recognize, using context, where substitutions have occurred   ==  Evelia Tay MD  [address]  Neurology Residency PGY-1

## 2021-03-08 NOTE — ASSESSMENT & PLAN NOTE
Patient used to be on lisinopril, but has not had a refill for several months   Notes that this was problematic for him and that caused erectile dysfunction    Plan:   Trial amlodipine

## 2021-03-08 NOTE — UTILIZATION REVIEW
Initial Clinical Review    Admission: Date/Time/Statement:   Admission Orders (From admission, onward)     Ordered        03/07/21 2043  Place in Observation  Once                   Orders Placed This Encounter   Procedures    Place in Observation     Standing Status:   Standing     Number of Occurrences:   1     Order Specific Question:   Level of Care     Answer:   Med Surg [16]     ED Arrival Information     Patient not seen in ED --tx from Eri [facility]yva "[address] ED                     Chief complaint: low back pain    Assessment/Plan: 47 y/o male with PMhx of IV methamphetamine use, tobacco abuse and a remote h/o IV opiate use in remission, who presents to [facility] ED as a transfer from [address] [place] [address] where he presented with one 1 wk h/o lower back pain radiating to his left leg and one day h/o urinary retention  He said he is having a very hard time peeing since yesterday  He denies perianal loss of sensation and fecal incontinence but does say that he hasn't had a bowel movement in a few days  Has sciatica symptoms all the time currently and getting worse since a week  No h/o trauma  Tx'd to [facility] for a stat MRI  WBC 17 40,  K 3 1, CL 93, CO2 32  Admit observation to M/S unit -- OOB as julia  Reg diet  PT/OT, SCD's    NSX consult 3/8 -- Exam with subjective radicular symptoms down the left lateral aspect of the leg, some urinary retention PTA but states this is improving, no weakness, saddle anesthesia noted  Ongoing discussion regarding possible need for surgical intervention on this admission  Continue with multimodal regimen, can trial medrol dose pack  If no surgery, can trial ACOSTA and PT in the outpatient setting  Pain control per primary team   SCDs, lovenox   Mobilize as tolerated with assistance, PT / OT evaluation    ED Triage Vitals   Temperature Pulse Respirations Blood Pressure SpO2   03/08/21 0017 03/08/21 0017 03/08/21 0017 03/08/21 0017 03/08/21 0017   97 8 °F (36 6 °C) 81 16 (!) 158/101 98 %      Temp src Heart Rate Source Patient Position - Orthostatic VS BP Location FiO2 (%)   -- -- -- -- --             Pain Score       03/07/21 2047       6          Wt Readings from Last 1 Encounters:   03/07/21 70 3 kg (155 lb)     Additional Vital Signs:   Date/Time  Temp  Pulse  Resp  BP  MAP (mmHg)  SpO2  O2 Device   03/08/21 0730  --  --  --  --  --  --  None (Room air)   03/08/21 07:11:32  97 9 °F (36 6 °C)  84  18  160/102Abnormal   121  98 %  --   03/08/21 00:17:25  97 8 °F (36 6 °C)  81  16  158/101Abnormal   120  98 %  --   03/07/21 2047  --  --  --  --  --  --  None (Room air)       Pertinent Labs/Diagnostic Test Results:   CXR 3/8 --No acute cardiopulmonary disease  MRI lumbar spine 3/8 -- No MR evidence for discitis osteomyelitis as clinically questioned  Left paracentral disc extrusion at L5-S1 posteriorly displaces and impinges the descending left S1 nerve root   Moderate to severe bilateral foraminal narrowing is present at this level with contact of the exiting L5 nerve roots  Degenerative changes in the remainder of the lumbar spine as described above  CT lumbar spine 3/7 -- No CT signs of discitis/osteomyelitis  Disc disease L4-L5 and L5-S1  Moderate bilateral neural foraminal narrowing at L5-S1       Results from last 7 days   Lab Units 03/07/21  1717   SARS-COV-2  Negative     Results from last 7 days   Lab Units 03/07/21  2205 03/07/21  1716   WBC Thousand/uL  --  17 40*   HEMOGLOBIN g/dL  --  16 8   HEMATOCRIT %  --  50 5*   PLATELETS Thousands/uL 330 419*   NEUTROS ABS Thousands/µL  --  12 20*         Results from last 7 days   Lab Units 03/07/21  1716   SODIUM mmol/L 137   POTASSIUM mmol/L 3 1*   CHLORIDE mmol/L 93*   CO2 mmol/L 32*   ANION GAP mmol/L 12   BUN mg/dL 18   CREATININE mg/dL 0 92   EGFR ml/min/1 73sq m 97   CALCIUM mg/dL 10 1     Results from last 7 days   Lab Units 03/07/21  1716   AST U/L 17   ALT U/L 19   ALK PHOS U/L 89   TOTAL PROTEIN g/dL 8 4 ALBUMIN g/dL 5 2   TOTAL BILIRUBIN mg/dL 0 40         Results from last 7 days   Lab Units 03/07/21  1716   GLUCOSE RANDOM mg/dL 109*           Results from last 7 days   Lab Units 03/07/21  2205 03/07/21  1716   LACTIC ACID mmol/L 1 3 2 2*     Results from last 7 days   Lab Units 03/07/21  1716   SED RATE mm/hour 11         Results from last 7 days   Lab Units 03/07/21  1716   CLARITY UA  Clear   COLOR UA  Yellow   SPEC GRAV UA  1 020   PH UA  6 0   GLUCOSE UA mg/dl Negative   KETONES UA mg/dl Negative   BLOOD UA  2+*   PROTEIN UA mg/dl Negative   NITRITE UA  Negative   BILIRUBIN UA  Negative   UROBILINOGEN UA E U /dl 0 2   LEUKOCYTES UA  Negative   WBC UA /hpf 0-1   RBC UA /hpf 4-10*   BACTERIA UA /hpf None Seen   EPITHELIAL CELLS WET PREP /hpf Occasional     Results from last 7 days   Lab Units 03/07/21  1717   INFLUENZA A PCR  Negative   INFLUENZA B PCR  Negative   RSV PCR  Negative         Results from last 7 days   Lab Units 03/07/21  1716   AMPH/METH  Positive*   BARBITURATE UR  Negative   BENZODIAZEPINE UR  Negative   COCAINE UR  Negative   METHADONE URINE  Negative   OPIATE UR  Negative   PCP UR  Negative   THC UR  Negative       Results from last 7 days   Lab Units 03/07/21  1716   BLOOD CULTURE  Received in Microbiology Lab  Culture in Progress  Received in Microbiology Lab  Culture in Progress         Past Medical History:   Diagnosis Date    Hyperlipidemia     Hypertension      Present on Admission:   Acute left-sided low back pain with sciatica   Sepsis ([address] )   Hypokalemia   Methamphetamine use ([facility])      Admitting Diagnosis: Back pain [M54 9]  Age/Sex: 48 y o  male  Admission Orders:  Scheduled Medications:  enoxaparin, 40 mg, Subcutaneous, Daily  nicotine, 1 patch, Transdermal, Daily  polyethylene glycol, 17 g, Oral, Daily    PRN Meds:  acetaminophen, 650 mg, Oral, Q6H PRN 3/7 x1, 3/8 x1  ibuprofen, 400 mg, Oral, Q6H PRN 3/7 x1, 3/8 x2  methocarbamol, 500 mg, Oral, Q6H PRN 3/7 x1, 3/8 x2        Network Utilization Review Department  ATTENTION: Please call with any questions or concerns to [phone] and carefully listen to the prompts so that you are directed to the right person  All voicemails are confidential   Maggie Goldstein all requests for admission clinical reviews, approved or denied determinations and any other requests to dedicated fax number below belonging to the campus where the patient is receiving treatment   List of dedicated fax numbers for the Facilities:  [address] DENIALS (Administrative/Medical Necessity) [phone]   [address] (Maternity/NICU/Pediatrics) [phone]   [address] Maury Arreguin 6415 (  Martín Leyva "Suzanne" 103) [address] [address] Lilia [place] 1481 [address] [place]) [address] [place] [address] [phone]

## 2021-03-08 NOTE — OCCUPATIONAL THERAPY NOTE
Occupational Therapy Evaluation     Patient Name: Sariah Da Silva  [id number] Date: 3/8/2021  Problem List  Principal Problem:    Acute left-sided low back pain with sciatica  Active Problems:    Methamphetamine use ([address] )    Hypertension    Past Medical History  Past Medical History:   Diagnosis Date    Hyperlipidemia     Hypertension      Past Surgical History  Past Surgical History:   Procedure Laterality Date    FEMUR FRACTURE SURGERY Right 1985 03/08/21 1045   OT Last Visit   OT Visit Date 03/08/21   Note Type   Note type Evaluation   Restrictions/Precautions   Weight Bearing Precautions Per Order No   Other Precautions Fall Risk;Pain;Spinal precautions   Pain Assessment   Pain Assessment Tool 0-10   Pain Score 7   Pain Location/Orientation Orientation: Left; Location: Back; Location: Buttocks; Location: Leg   Hospital Pain Intervention(s) Repositioned; Ambulation/increased activity; Emotional support   Home Living   Type of [facility] [address] level;Stairs to enter with rails   Bathroom Shower/Tub Tub/shower unit   [employer] Standard   Prior Function   Level of [place] Independent with ADLs and functional mobility   Lives With Significant other   Receives Help From Family   ADL Assistance Independent   IADLs Needs assistance   Falls in the last 6 months 1 to 4   Vocational Full time employment   Lifestyle   Autonomy I adls and mobility - i iadls - shares homemaking with family    Reciprocal Relationships supportive family and friends    Service to Others works FT - not currently working per pt    Intrinsic Gratification active pta   Subjective   Subjective c/o pain    ADL   Eating Assistance 7  Independent   Grooming Assistance 5  Supervision/Setup   [address] [place] [address] [address]  5  Supervision/Setup   LB Dressing Assistance 5  [address]  5  Supervision/Setup   Bed Mobility   Supine to Sit 5  Supervision   Transfers   Sit to Stand 5  Supervision   Stand to Sit 5  Supervision   Stand pivot 5  Supervision   Functional Mobility   Functional Mobility 5  Supervision   Additional Comments limited by pain    Balance   Static Sitting Fair +   Dynamic Sitting Fair   Static Standing Fair   Dynamic Standing Fair   Ambulatory Fair   Activity Tolerance   Activity Tolerance Patient limited by pain   RUE Assessment   RUE Assessment WFL   LUE Assessment   LUE Assessment WFL   Cognition   Overall Cognitive Status WFL   Assessment   Limitation Decreased endurance;Decreased self-care trans;Decreased high-level ADLs   Prognosis Good   Assessment Pt is a 48 y o  male who was admitted to [facility] on 3/7/2021 with Acute left-sided low back pain with sciatica -MRI revealed L paracentral disc extrusion at L5-S1 posteriorly displacing and impinging on L S1 nerve root  Pt's problem list also includes PMH of h/o IVDA, tobacco abuse  At baseline pt was completing adls and mobility independently - I iadls - shares homemaking with family   Pt lives in 2nd floor apt with FF to enter  Currently pt requires sba for overall ADLS and sba for functional mobility/transfers  Pt currently presents with impairments in the following categories -steps to enter environment, difficulty performing IADLS  and environment activity tolerance, endurance and standing balance/tolerance  These impairments, as well as pt's fatigue, pain, spinal precautions, risk for falls and home environment  limit pt's ability to safely engage in all baseline areas of occupation, includingfunctional mobility/transfers, community mobility, laundry , driving, house maintenance, meal prep, cleaning, work/volunteer work , social participation  and leisure activities however anticipate once pain is controlled pt will be able to manage Shira From OT standpoint, recommend home with family support upon D/C   No further acute OT needs indicated at this time-ok for d/c home when pain managed/medically cleared- d/c from caseload   Goals   Patient Goals have less pain    Plan   OT Frequency Eval only   Recommendation   OT Discharge Recommendation Return to previous environment with social support   OT - OK to Discharge Yes   Additional Comments  when medically cleared    AM-PAC Daily Activity Inpatient   Lower Body Dressing 3   Bathing 3   Toileting 4   Upper Body Dressing 4   Grooming 4   Eating 4   Daily Activity Raw Score 22   Daily Activity Standardized Score (Calc for Raw Score >=11) 47  1   AM-PAC Applied Cognition Inpatient   Following a Speech/Presentation 4   Understanding Ordinary Conversation 4   Taking Medications 4   Remembering Where Things Are Placed or Put Away 4   Remembering List of 4-5 Errands 4   Taking Care of Complicated Tasks 4   Applied Cognition Raw Score 24   Applied Cognition Standardized Score 62 21     The patient's raw score on the AM-PAC Daily Activity inpatient short form is 22, standardized score is 47 1, greater than 39 4  Patients at this level are likely to benefit from discharge to home  Please refer to the recommendation of the Occupational Therapist for safe discharge planning      Daylin Hutton

## 2021-03-09 ENCOUNTER — APPOINTMENT (OUTPATIENT)
Dept: RADIOLOGY | Facility: HOSPITAL | Age: 51
End: 2021-03-09
Attending: RADIOLOGY
Payer: COMMERCIAL

## 2021-03-09 VITALS
RESPIRATION RATE: 16 BRPM | HEIGHT: 68 IN | SYSTOLIC BLOOD PRESSURE: 157 MMHG | OXYGEN SATURATION: 92 % | HEART RATE: 81 BPM | WEIGHT: 155 LBS | TEMPERATURE: 98.2 F | BODY MASS INDEX: 23.49 KG/M2 | DIASTOLIC BLOOD PRESSURE: 92 MMHG

## 2021-03-09 PROBLEM — D72.829 LEUKOCYTOSIS: Status: ACTIVE | Noted: 2021-03-07

## 2021-03-09 LAB
ANION GAP SERPL CALCULATED.3IONS-SCNC: 7 MMOL/L (ref 4–13)
BASOPHILS # BLD AUTO: 0.04 THOUSANDS/ΜL (ref 0–0.1)
BASOPHILS NFR BLD AUTO: 0 % (ref 0–1)
BUN SERPL-MCNC: 15 MG/DL (ref 5–25)
CALCIUM SERPL-MCNC: 9.1 MG/DL (ref 8.3–10.1)
CHLORIDE SERPL-SCNC: 99 MMOL/L (ref 100–108)
CO2 SERPL-SCNC: 28 MMOL/L (ref 21–32)
CREAT SERPL-MCNC: 0.7 MG/DL (ref 0.6–1.3)
EOSINOPHIL # BLD AUTO: 0.08 THOUSAND/ΜL (ref 0–0.61)
EOSINOPHIL NFR BLD AUTO: 1 % (ref 0–6)
ERYTHROCYTE [DISTWIDTH] IN BLOOD BY AUTOMATED COUNT: 13.5 % (ref 11.6–15.1)
GFR SERPL CREATININE-BSD FRML MDRD: 110 ML/MIN/1.73SQ M
GLUCOSE SERPL-MCNC: 97 MG/DL (ref 65–140)
HCT VFR BLD AUTO: 46.5 % (ref 36.5–49.3)
HGB BLD-MCNC: 15.2 G/DL (ref 12–17)
IMM GRANULOCYTES # BLD AUTO: 0.07 THOUSAND/UL (ref 0–0.2)
IMM GRANULOCYTES NFR BLD AUTO: 0 % (ref 0–2)
LYMPHOCYTES # BLD AUTO: 2.48 THOUSANDS/ΜL (ref 0.6–4.47)
LYMPHOCYTES NFR BLD AUTO: 14 % (ref 14–44)
MCH RBC QN AUTO: 29.3 PG (ref 26.8–34.3)
MCHC RBC AUTO-ENTMCNC: 32.7 G/DL (ref 31.4–37.4)
MCV RBC AUTO: 90 FL (ref 82–98)
MONOCYTES # BLD AUTO: 0.95 THOUSAND/ΜL (ref 0.17–1.22)
MONOCYTES NFR BLD AUTO: 6 % (ref 4–12)
NEUTROPHILS # BLD AUTO: 13.81 THOUSANDS/ΜL (ref 1.85–7.62)
NEUTS SEG NFR BLD AUTO: 79 % (ref 43–75)
NRBC BLD AUTO-RTO: 0 /100 WBCS
PLATELET # BLD AUTO: 330 THOUSANDS/UL (ref 149–390)
PMV BLD AUTO: 9.4 FL (ref 8.9–12.7)
POTASSIUM SERPL-SCNC: 3.6 MMOL/L (ref 3.5–5.3)
RBC # BLD AUTO: 5.19 MILLION/UL (ref 3.88–5.62)
SODIUM SERPL-SCNC: 134 MMOL/L (ref 136–145)
WBC # BLD AUTO: 17.43 THOUSAND/UL (ref 4.31–10.16)

## 2021-03-09 PROCEDURE — 80048 BASIC METABOLIC PNL TOTAL CA: CPT | Performed by: PSYCHIATRY & NEUROLOGY

## 2021-03-09 PROCEDURE — 62323 NJX INTERLAMINAR LMBR/SAC: CPT | Performed by: RADIOLOGY

## 2021-03-09 PROCEDURE — 85025 COMPLETE CBC W/AUTO DIFF WBC: CPT | Performed by: PSYCHIATRY & NEUROLOGY

## 2021-03-09 PROCEDURE — 62323 NJX INTERLAMINAR LMBR/SAC: CPT

## 2021-03-09 RX ORDER — LABETALOL 20 MG/4 ML (5 MG/ML) INTRAVENOUS SYRINGE
10 ONCE
Status: COMPLETED | OUTPATIENT
Start: 2021-03-09 | End: 2021-03-09

## 2021-03-09 RX ORDER — METHOCARBAMOL 500 MG/1
1000 TABLET, FILM COATED ORAL EVERY 6 HOURS PRN
Qty: 50 TABLET | Refills: 0 | Status: SHIPPED | OUTPATIENT
Start: 2021-03-09 | End: 2021-03-09 | Stop reason: HOSPADM

## 2021-03-09 RX ORDER — IBUPROFEN 800 MG/1
800 TABLET ORAL EVERY 8 HOURS PRN
Qty: 30 TABLET | Refills: 0 | Status: SHIPPED | OUTPATIENT
Start: 2021-03-09 | End: 2021-03-09 | Stop reason: HOSPADM

## 2021-03-09 RX ORDER — METHOCARBAMOL 500 MG/1
250 TABLET, FILM COATED ORAL ONCE
Status: COMPLETED | OUTPATIENT
Start: 2021-03-09 | End: 2021-03-09

## 2021-03-09 RX ORDER — IBUPROFEN 800 MG/1
800 TABLET ORAL EVERY 6 HOURS PRN
Qty: 30 TABLET | Refills: 0 | Status: SHIPPED | OUTPATIENT
Start: 2021-03-09

## 2021-03-09 RX ORDER — GABAPENTIN 100 MG/1
100 CAPSULE ORAL ONCE
Status: DISCONTINUED | OUTPATIENT
Start: 2021-03-09 | End: 2021-03-09

## 2021-03-09 RX ORDER — KETOROLAC TROMETHAMINE 30 MG/ML
15 INJECTION, SOLUTION INTRAMUSCULAR; INTRAVENOUS ONCE
Status: COMPLETED | OUTPATIENT
Start: 2021-03-09 | End: 2021-03-09

## 2021-03-09 RX ORDER — METHOCARBAMOL 500 MG/1
1000 TABLET, FILM COATED ORAL EVERY 6 HOURS PRN
Status: DISCONTINUED | OUTPATIENT
Start: 2021-03-09 | End: 2021-03-09 | Stop reason: HOSPADM

## 2021-03-09 RX ORDER — METHYLPREDNISOLONE 4 MG/1
TABLET ORAL
Qty: 21 TABLET | Refills: 0 | Status: SHIPPED | OUTPATIENT
Start: 2021-03-09 | End: 2021-03-11

## 2021-03-09 RX ORDER — AMLODIPINE BESYLATE 5 MG/1
5 TABLET ORAL DAILY
Qty: 30 TABLET | Refills: 0 | Status: CANCELLED | OUTPATIENT
Start: 2021-03-10

## 2021-03-09 RX ORDER — IBUPROFEN 400 MG/1
800 TABLET ORAL EVERY 8 HOURS PRN
Status: DISCONTINUED | OUTPATIENT
Start: 2021-03-09 | End: 2021-03-09 | Stop reason: HOSPADM

## 2021-03-09 RX ORDER — AMLODIPINE BESYLATE 5 MG/1
5 TABLET ORAL DAILY
Qty: 30 TABLET | Refills: 0 | Status: SHIPPED | OUTPATIENT
Start: 2021-03-10 | End: 2021-03-09 | Stop reason: HOSPADM

## 2021-03-09 RX ORDER — DEXAMETHASONE SODIUM PHOSPHATE 4 MG/ML
INJECTION, SOLUTION INTRA-ARTICULAR; INTRALESIONAL; INTRAMUSCULAR; INTRAVENOUS; SOFT TISSUE CODE/TRAUMA/SEDATION MEDICATION
Status: COMPLETED | OUTPATIENT
Start: 2021-03-09 | End: 2021-03-09

## 2021-03-09 RX ORDER — OXYCODONE HYDROCHLORIDE 10 MG/1
10 TABLET ORAL ONCE
Status: COMPLETED | OUTPATIENT
Start: 2021-03-09 | End: 2021-03-09

## 2021-03-09 RX ORDER — METHYLPREDNISOLONE 4 MG/1
TABLET ORAL
Qty: 21 TABLET | Refills: 0 | Status: CANCELLED | OUTPATIENT
Start: 2021-03-09

## 2021-03-09 RX ORDER — OXYCODONE HYDROCHLORIDE 5 MG/1
5 TABLET ORAL EVERY 4 HOURS PRN
Status: DISCONTINUED | OUTPATIENT
Start: 2021-03-09 | End: 2021-03-09 | Stop reason: HOSPADM

## 2021-03-09 RX ORDER — OXYCODONE HYDROCHLORIDE 10 MG/1
10 TABLET ORAL EVERY 4 HOURS PRN
Status: DISCONTINUED | OUTPATIENT
Start: 2021-03-09 | End: 2021-03-09 | Stop reason: HOSPADM

## 2021-03-09 RX ORDER — METHOCARBAMOL 500 MG/1
1000 TABLET, FILM COATED ORAL 2 TIMES DAILY
Qty: 30 TABLET | Refills: 0 | Status: SHIPPED | OUTPATIENT
Start: 2021-03-09

## 2021-03-09 RX ORDER — AMLODIPINE BESYLATE 5 MG/1
5 TABLET ORAL DAILY
Qty: 30 TABLET | Refills: 0 | Status: CANCELLED | OUTPATIENT
Start: 2021-03-09

## 2021-03-09 RX ORDER — AMLODIPINE BESYLATE 5 MG/1
5 TABLET ORAL DAILY
Status: DISCONTINUED | OUTPATIENT
Start: 2021-03-09 | End: 2021-03-09 | Stop reason: HOSPADM

## 2021-03-09 RX ORDER — AMLODIPINE BESYLATE 5 MG/1
5 TABLET ORAL DAILY
Qty: 30 TABLET | Refills: 0 | Status: SHIPPED | OUTPATIENT
Start: 2021-03-09

## 2021-03-09 RX ORDER — HYDRALAZINE HYDROCHLORIDE 20 MG/ML
5 INJECTION INTRAMUSCULAR; INTRAVENOUS ONCE
Status: COMPLETED | OUTPATIENT
Start: 2021-03-09 | End: 2021-03-09

## 2021-03-09 RX ORDER — AMLODIPINE BESYLATE 5 MG/1
5 TABLET ORAL DAILY
Qty: 30 TABLET | Refills: 0 | Status: CANCELLED | OUTPATIENT
Start: 2021-03-10 | End: 2021-04-09

## 2021-03-09 RX ADMIN — ACETAMINOPHEN 650 MG: 325 TABLET ORAL at 06:02

## 2021-03-09 RX ADMIN — HYDRALAZINE HYDROCHLORIDE 5 MG: 20 INJECTION INTRAMUSCULAR; INTRAVENOUS at 04:43

## 2021-03-09 RX ADMIN — OXYCODONE HYDROCHLORIDE 10 MG: 10 TABLET ORAL at 12:55

## 2021-03-09 RX ADMIN — DEXAMETHASONE SODIUM PHOSPHATE 30 MG: 4 INJECTION INTRA-ARTICULAR; INTRALESIONAL; INTRAMUSCULAR; INTRAVENOUS; SOFT TISSUE at 12:12

## 2021-03-09 RX ADMIN — POLYETHYLENE GLYCOL 3350 17 G: 17 POWDER, FOR SOLUTION ORAL at 08:04

## 2021-03-09 RX ADMIN — AMLODIPINE BESYLATE 5 MG: 5 TABLET ORAL at 09:27

## 2021-03-09 RX ADMIN — LABETALOL 20 MG/4 ML (5 MG/ML) INTRAVENOUS SYRINGE 10 MG: at 00:30

## 2021-03-09 RX ADMIN — KETOROLAC TROMETHAMINE 15 MG: 30 INJECTION, SOLUTION INTRAMUSCULAR at 04:42

## 2021-03-09 RX ADMIN — DICLOFENAC SODIUM 2 G: 10 GEL TOPICAL at 02:11

## 2021-03-09 RX ADMIN — METHOCARBAMOL 1000 MG: 500 TABLET, FILM COATED ORAL at 04:41

## 2021-03-09 RX ADMIN — IBUPROFEN 800 MG: 400 TABLET ORAL at 09:34

## 2021-03-09 RX ADMIN — NICOTINE 1 PATCH: 21 PATCH, EXTENDED RELEASE TRANSDERMAL at 08:05

## 2021-03-09 RX ADMIN — METHOCARBAMOL 250 MG: 500 TABLET, FILM COATED ORAL at 00:28

## 2021-03-09 RX ADMIN — IOHEXOL 2 ML: 300 INJECTION, SOLUTION INTRAVENOUS at 12:33

## 2021-03-09 RX ADMIN — OXYCODONE HYDROCHLORIDE 10 MG: 10 TABLET ORAL at 08:04

## 2021-03-09 NOTE — PROGRESS NOTES
INTERNAL MEDICINE RESIDENCY PROGRESS NOTE     Name: Kristen Vazquez   Age & Sex: 48 y o  male   MRN: [id number]  Unit/Bed#: -01   Encounter: [id number]  Team: SOD Team B     PATIENT INFORMATION     Name: Kristen Vazquez   Age & Sex: 48 y o  male   MRN: [id number]  Hospital Stay Days: 1    ASSESSMENT/PLAN     Principal Problem:    Acute left-sided low back pain with sciatica  Active Problems:    Leukocytosis    Methamphetamine use ([facility] )    Hypertension    * Acute left-sided low back pain with sciatica  Assessment & Plan  Patient with one week history of left sided sciatica symptoms with low back pain  · Uses iv methamphetamine with tap water- last use 1 day ago  · Also complaining of urinary retention and erectile dysfunctions x 2-3 months  · CT lumbar spine w/ moderate L5-S1 bilateral foraminal narrowing  · MRI demonstrated no osteomyelitis, diskitis, epidural abscess, but left paracentral disc extrusion at L5-S1 posteriorly displaces and impinges on S1 nerve root    Plan:  · Consulted neurosurgery, appreciate recs  · Robaxin for muscle spasms  · Tylenol and ibuprofen for pain  · Lumbar support brace on discharge  · Medrol dose zelda on discharge  · ACOSTA today - held lovenox    Hypertension  Assessment & Plan  Patient used to be on lisinopril, but has not had a refill for several months   Notes that this was problematic for him and that caused erectile dysfunction    Plan:   Trial amlodipine    [facility] )  Assessment & Plan  Reports IV use of methamphetamine mixed with tap water  Urine screen positive  Reported to ED that he used yesterday but not today     At risk for bacteremia, SEA, endocarditis   CM consulted - patient refuses all information and interventions   Advised to f/u w/ PCP and discussed dangers of continuing use, particularly with ongoing health issues, patient endorsed understanding and agreement    Leukocytosis  Assessment & Plan  Patient mets sepsis criteria on admission with leukocytosis and lactic acidosis   Given active IV drug use as well as new neurologic symptoms as detailed above, there is concern for possible spinal epidural abscess   Received cefepime 2 mg prior to transfer, 2 L IV fluid boluses   Stat MRI approved by Radiology (Dr Melanie Dawson): No MR evidence for discitis osteomyelitis as clinically questioned  Left paracentral disc extrusion at L5-S1 posteriorly displaces and impinges the descending left S1 nerve root   Moderate to severe bilateral foraminal narrowing is present at this level with contact of the exiting L5 nerve roots  Degenerative changes in the remainder of the lumbar spine as described above   Unclear infectious source, UA negative, BCx NG24, no GI sx, CXR clear    Plan:   Repeat labs this morning w/ increasing WBC to 17 again   Patient now hypertensive to 160s (no indication for continuing fluid at this time)   MRI negative for diskitis, osteomyelitis, epidural abscess   F/u blood cultures   May need to reconsider antibiotics    Hypokalemia-resolved as of 3/8/2021  Assessment & Plan  Potassium of 3 1 on presentation   Received 40mEq PO in ED   Recheck labs and replete prn    Disposition: pending ACOSTA and neurosurgery recommendations     SUBJECTIVE     Patient seen and examined  No critical events overnight, but patient was in extreme amount of pain overnight and was trying to leave A  NS now to take patient to IR for ACOSTA today, holding lovenox for now  Starting on norvasc, pain regimen  Patient denies N/V/F/C, SOB, CP, focal weakness, but does endorse persistent urinary hesitancy and severe left-sided radicular pain  No focal neurologic deficits on exam except for restriction in LLE straight leg raise       OBJECTIVE     Vitals:    03/09/21 0319 03/09/21 0558 03/09/21 0559 03/09/21 0808   BP: (!) 163/106 (!) 163/104 (!) 163/104 (!) 151/104   Pulse: 72 88 81    Resp: 17   18   Temp:    98 °F (36 7 °C)   SpO2: 98% 96% 98% Weight:       Height:          Temperature:   Temp (24hrs), Av °F (36 7 °C), Min:97 8 °F (36 6 °C), Max:98 3 °F (36 8 °C)    Temperature: 98 °F (36 7 °C)  Intake & Output:  I/O       / 0701 - / 0700 / 0701 -  0700 / 0701 - 03/10 0700    P  O  880 1140     Total Intake(mL/kg) 880 (12 5) 1140 (16 2)     Net +880 +1140            Unmeasured Urine Occurrence 3 x 5 x 1 x    Unmeasured Stool Occurrence  2 x         Weights:   IBW: 68 4 kg    Body mass index is 23 57 kg/m²  Weight (last 2 days)     Date/Time   Weight    21   70 3 (155)    21   70 3 (155)            Physical Exam  Vitals signs reviewed  Constitutional:       General: He is not in acute distress  Appearance: Normal appearance  He is well-developed  He is not ill-appearing, toxic-appearing or diaphoretic  HENT:      Head: Normocephalic and atraumatic  Right Ear: External ear normal       Left Ear: External ear normal       Nose: Nose normal  No congestion or rhinorrhea  Mouth/Throat:      Mouth: Mucous membranes are moist       Pharynx: Oropharynx is clear  Eyes:      General: No scleral icterus  Extraocular Movements: Extraocular movements intact  Conjunctiva/sclera: Conjunctivae normal    Neck:      Trachea: No tracheal deviation  Cardiovascular:      Rate and Rhythm: Normal rate and regular rhythm  Heart sounds: Normal heart sounds  No murmur  No friction rub  No gallop  Pulmonary:      Effort: Pulmonary effort is normal  No respiratory distress  Breath sounds: Normal breath sounds  No stridor  No wheezing, rhonchi or rales  Abdominal:      General: Abdomen is flat  Bowel sounds are normal  There is no distension  Palpations: Abdomen is soft  Tenderness: There is no abdominal tenderness  There is no guarding or rebound  Musculoskeletal:      Right lower leg: No edema  Left lower leg: No edema  Skin:     General: Skin is warm and dry  Coloration: Skin is not jaundiced or pale  Neurological:      General: No focal deficit present  Mental Status: He is alert and oriented to person, place, and time  Cranial Nerves: No cranial nerve deficit  Sensory: No sensory deficit  Motor: Weakness (limited ROM LLE likely pain-limited rather than weakness per se) present  No abnormal muscle tone  Coordination: Coordination normal       Gait: Gait abnormal    Psychiatric:         Mood and Affect: Mood normal          Behavior: Behavior normal        LABORATORY DATA     Labs: I have personally reviewed pertinent reports  Results from last 7 days   Lab Units 03/09/21  0442 03/08/21  1126 03/07/21  2205 03/07/21  1716   WBC Thousand/uL 17 43* 11 14*  --  17 40*   HEMOGLOBIN g/dL 15 2 15 4  --  16 8   HEMATOCRIT % 46 5 46 7  --  50 5*   PLATELETS Thousands/uL 330 341 330 419*   NEUTROS PCT % 79* 65  --  70   MONOS PCT % 6 5  --  6      Results from last 7 days   Lab Units 03/09/21 0442 03/08/21  1126 03/07/21  1716   POTASSIUM mmol/L 3 6 3 8 3 1*   CHLORIDE mmol/L 99* 105 93*   CO2 mmol/L 28 27 32*   BUN mg/dL 15 13 18   CREATININE mg/dL 0 70 0 82 0 92   CALCIUM mg/dL 9 1 9 1 10 1   ALK PHOS U/L  --   --  89   ALT U/L  --   --  19   AST U/L  --   --  17     Results from last 7 days   Lab Units 03/08/21  1126   MAGNESIUM mg/dL 2 3              Results from last 7 days   Lab Units 03/08/21  1126   LACTIC ACID mmol/L 1 6           IMAGING & DIAGNOSTIC TESTING     Radiology Results: I have personally reviewed pertinent reports  Xr Chest 1 View Portable    Result Date: 3/8/2021  Impression: No acute cardiopulmonary disease  Workstation performed: [id number]     Ct Spine Lumbar Without Contrast    Result Date: 3/7/2021  Impression: No CT signs of discitis/osteomyelitis  Disc disease L4-L5 and L5-S1  Moderate bilateral neural foraminal narrowing at L5-S1   Workstation performed: [id number]     Mri Lumbar Spine W Wo Contrast    Result Date: 3/8/2021  Impression: No MR evidence for discitis osteomyelitis as clinically questioned  Left paracentral disc extrusion at L5-S1 posteriorly displaces and impinges the descending left S1 nerve root  Moderate to severe bilateral foraminal narrowing is present at this level with contact of the exiting L5 nerve roots  Degenerative changes in the remainder of the lumbar spine as described above  Workstation performed: [id number]     Other Diagnostic Testing: I have personally reviewed pertinent reports  ACTIVE MEDICATIONS     Current Facility-Administered Medications   Medication Dose Route Frequency    acetaminophen (TYLENOL) tablet 650 mg  650 mg Oral Q6H PRN    amLODIPine (NORVASC) tablet 5 mg  5 mg Oral Daily    ibuprofen (MOTRIN) tablet 800 mg  800 mg Oral Q8H PRN    methocarbamol (ROBAXIN) tablet 1,000 mg  1,000 mg Oral Q6H PRN    nicotine (NICODERM CQ) 21 mg/24 hr TD 24 hr patch 1 patch  1 patch Transdermal Daily    oxyCODONE (ROXICODONE) immediate release tablet 10 mg  10 mg Oral Q4H PRN    oxyCODONE (ROXICODONE) IR tablet 5 mg  5 mg Oral Q4H PRN    pneumococcal 23-valent polysaccharide vaccine (PNEUMOVAX-23) injection 0 5 mL  0 5 mL Subcutaneous Prior to discharge    polyethylene glycol (MIRALAX) packet 17 g  17 g Oral Daily     VTE Pharmacologic Prophylaxis: RX contraindicated due to: held for procedure  VTE Mechanical Prophylaxis: Patient ambulatory and low risk    Portions of the record may have been created with voice recognition software  Occasional wrong word or "sound a like" substitutions may have occurred due to the inherent limitations of voice recognition software    Read the chart carefully and recognize, using context, where substitutions have occurred     ==    Jason Wright MD  [facility]  Neurology Residency PGY-1

## 2021-03-09 NOTE — PLAN OF CARE
Problem: PAIN - ADULT  Goal: Verbalizes/displays adequate comfort level or baseline comfort level  Description: Interventions:  - Encourage patient to monitor pain and request assistance  - Assess pain using appropriate pain scale  - Administer analgesics based on type and severity of pain and evaluate response  - Implement non-pharmacological measures as appropriate and evaluate response  - Consider cultural and social influences on pain and pain management  - Notify physician/advanced practitioner if interventions unsuccessful or patient reports new pain  Outcome: Adequate for Discharge     Problem: INFECTION - ADULT  Goal: Absence or prevention of progression during hospitalization  Description: INTERVENTIONS:  - Assess and monitor for signs and symptoms of infection  - Monitor lab/diagnostic results  - Monitor all insertion sites, i e  indwelling lines, tubes, and drains  - Monitor endotracheal if appropriate and nasal secretions for changes in amount and color  - [place] appropriate cooling/warming therapies per order  - Administer medications as ordered  - Instruct and encourage patient and family to use good hand hygiene technique  - Identify and instruct in appropriate isolation precautions for identified infection/condition  Outcome: Adequate for Discharge  Goal: Absence of fever/infection during neutropenic period  Description: INTERVENTIONS:  - Monitor WBC    Outcome: Adequate for Discharge     Problem: SAFETY ADULT  Goal: Patient will remain free of falls  Description: INTERVENTIONS:  - Assess patient frequently for physical needs  -  Identify cognitive and physical deficits and behaviors that affect risk of falls    -  [place] fall precautions as indicated by assessment   - Educate patient/family on patient safety including physical limitations  - Instruct patient to call for assistance with activity based on assessment  - Modify environment to reduce risk of injury  - Consider OT/PT consult to assist with strengthening/mobility  Outcome: Adequate for Discharge  Goal: Maintain or return to baseline ADL function  Description: INTERVENTIONS:  -  Assess patient's ability to carry out ADLs; assess patient's baseline for ADL function and identify physical deficits which impact ability to perform ADLs (bathing, care of mouth/teeth, toileting, grooming, dressing, etc )  - Assess/evaluate cause of self-care deficits   - Assess range of motion  - Assess patient's mobility; develop plan if impaired  - Assess patient's need for assistive devices and provide as appropriate  - Encourage maximum independence but intervene and supervise when necessary  - Involve family in performance of ADLs  - Assess for home care needs following discharge   - Consider OT consult to assist with ADL evaluation and planning for discharge  - Provide patient education as appropriate  Outcome: Adequate for Discharge  Goal: Maintain or return mobility status to optimal level  Description: INTERVENTIONS:  - Assess patient's baseline mobility status (ambulation, transfers, stairs, etc )    - Identify cognitive and physical deficits and behaviors that affect mobility  - Identify mobility aids required to assist with transfers and/or ambulation (gait belt, sit-to-stand, lift, walker, cane, etc )  - [place] fall precautions as indicated by assessment  - Record patient progress and toleration of activity level on Mobility SBAR; progress patient to next Phase/Stage  - Instruct patient to call for assistance with activity based on assessment  - Consider rehabilitation consult to assist with strengthening/weightbearing, etc   Outcome: Adequate for Discharge     Problem: DISCHARGE PLANNING  Goal: Discharge to home or other facility with appropriate resources  Description: INTERVENTIONS:  - Identify barriers to discharge w/patient and caregiver  - Arrange for needed discharge resources and transportation as appropriate  - Identify discharge learning needs (meds, wound care, etc )  - Arrange for interpretive services to assist at discharge as needed  - Refer to Case Management Department for coordinating discharge planning if the patient needs post-hospital services based on physician/advanced practitioner order or complex needs related to functional status, cognitive ability, or social support system  Outcome: Adequate for Discharge     Problem: Knowledge Deficit  Goal: Patient/family/caregiver demonstrates understanding of disease process, treatment plan, medications, and discharge instructions  Description: Complete learning assessment and assess knowledge base    Interventions:  - Provide teaching at level of understanding  - Provide teaching via preferred learning methods  Outcome: Adequate for Discharge

## 2021-03-09 NOTE — SEDATION DOCUMENTATION
Epidural steroid injection to L5-S1 by Dr Ezzie Meigs without complications  Tolerated well by patient  VSS throughout

## 2021-03-09 NOTE — PROGRESS NOTES
Dr Frida Reynolds notified that BP is unchanged after both Labetalol and hydralizine and that patient is now c/o sudden severe headache  She states that she "will let day team know"  Patient's pain and muscle spasms have also not improved with an increased Robaxin dose and administration of Toradol

## 2021-03-09 NOTE — PROGRESS NOTES
Lauren Bustamante Notified twice through out night of patient's increased, uncontrolled pain and muscle spasms in L Back, buttox and leg  Also notified   And hospital supervisor that [facility] has no Aqua K pads available  Patient requesting to leave A

## 2021-03-09 NOTE — DISCHARGE INSTRUCTIONS
Epidural Steroid Injection   WHAT YOU NEED TO KNOW:   An epidural steroid injection (ACOSTA) is a procedure to inject steroid medicine into the epidural space  The epidural space is between your spinal cord and vertebrae  Steroids reduce inflammation and fluid buildup in your spine that may be causing pain  You may be given pain medicine along with the steroids  DISCHARGE INSTRUCTIONS:   Call your local emergency number (911 in the [address] [place] [address],[address]) if:   · You have a seizure  · You have trouble moving your legs  Seek care immediately if:   · Blood soaks through your bandage  · You have a fever or chills, severe back pain, and the procedure area is sensitive to the touch  · You cannot control when you urinate or have a bowel movement  Call your doctor if:   · You have weakness or numbness in your legs  · Your wound is red, swollen, or draining pus  · You have nausea or are vomiting  · Your face or neck is red and you feel warm  · You have more pain than you had before the procedure  · You have swelling in your hands or feet  · You have questions or concerns about your condition or care  Care for your wound as directed: You may remove the bandage before you go to bed the day of your procedure  You may take a shower, but do not take a bath for at least 24 hours  Self-care:   · Do not drive,  use machines, or do strenuous activity for 24 hours after your procedure or as directed  · Continue other treatments  as directed  Steroid injections alone will not control your pain  The injections are meant to be used with other treatments, such as physical therapy  Follow up with your doctor as directed:  Write down your questions so you remember to ask them during your visits  © Copyright [address] Information is for End User's use only and may not be sold, redistributed or otherwise used for commercial purposes   All illustrations and images included in CareNotes® are the copyrighted property of [employer]  or [address]   The above information is an  only  It is not intended as medical advice for individual conditions or treatments  Talk to your doctor, nurse or pharmacist before following any medical regimen to see if it is safe and effective for you  Radiculopathy   WHAT YOU NEED TO KNOW:   Lumbosacral radiculopathy is a painful condition that happens when a nerve in your lumbosacral spine (lower back) is pinched or irritated  Nerves control feeling and movement in your body  You may have numbness or pain that shoots down from your lower back towards your foot  DISCHARGE INSTRUCTIONS:   Medicines:   · Medicines:   ? NSAIDs , such as ibuprofen, help decrease swelling, pain, and fever  This medicine is available with or without a doctor's order  NSAIDs can cause stomach bleeding or kidney problems in certain people  If you take blood thinner medicine, always ask your healthcare provider if NSAIDs are safe for you  Always read the medicine label and follow directions  ? Muscle relaxers  help decrease pain and muscle spasms  ? Opioids: This is a strong medicine given to reduce severe pain  It is also called narcotic pain medicine  Take this medicine exactly as directed by your healthcare provider  ? Oral steroids: Steroids may also be given to reduce pain and swelling  ? Take your medicine as directed  Contact your healthcare provider if you think your medicine is not helping or if you have side effects  Tell him of her if you are allergic to any medicine  Keep a list of the medicines, vitamins, and herbs you take  Include the amounts, and when and why you take them  Bring the list or the pill bottles to follow-up visits  Carry your medicine list with you in case of an emergency    Follow up with your healthcare provider or spine specialist within 1 to 3 weeks:  After your first follow-up appointment, return to your healthcare provider or spine specialist every 2 weeks until you have healed  Ask for information about physical therapy for your condition  Write down your questions so you remember to ask them during your visits  Physical therapy:  You may need physical therapy to improve your condition  Your physical therapist may teach you certain exercises to improve posture (the way you stand and sit), flexibility, and strength in your lower back  Self care:   · Stay active: It is best to be active when you have lumbar radiculopathy  Your physical therapist or healthcare provider may tell you to take walks to ease yourself back into your daily routine  Avoid long periods of bed rest  Bed rest could worsen your symptoms  Do not move in ways that increase your pain  Ask for more information about the best ways to stay active  · Use ice or heat packs:  Use ice or heat packs as directed on the sore area of your body to decrease the pain and swelling  Put ice in a plastic bag covered with a towel on your low back  Cover heated items with a towel to avoid burns  Use ice and heat as directed  · Avoid heavy lifting: Your condition may worsen if you lift heavy things  Avoid lifting if possible  · Maintain a healthy weight:  Excess body weight may strain your back  Talk with your healthcare provider about ways to lose excess weight if you are overweight  Contact your healthcare provider or spine specialist if:   · Your pain does not improve within 1 to 3 weeks after treatment  · Your pain and weakness keep you from your normal activities at work, home, or school  · You lose more than 10 pounds in 6 months without trying  · You become depressed or sad because of the pain  · You have questions or concerns about your condition or care  Return to the emergency department if:   · You have a fever greater than 100 4°F for longer than 2 days  · You have new, severe back or leg pain, or your pain spreads to both legs    · You have any new signs of numbness or weakness, especially in your lower back, legs, arms, or genital area  · You have new trouble controlling your urine and bowel movements  · You do not feel like your bladder empties when you urinate  © Copyright [address] Information is for End User's use only and may not be sold, redistributed or otherwise used for commercial purposes  All illustrations and images included in CareNotes® are the copyrighted property of [employer]  or [facility] [address]   The above information is an  only  It is not intended as medical advice for individual conditions or treatments  Talk to your doctor, nurse or pharmacist before following any medical regimen to see if it is safe and effective for you

## 2021-03-09 NOTE — DISCHARGE SUMMARY
Discharge- Loc Sahni 1970, 48 y o  male MRN: [id number]    Unit/Bed#: -01 Encounter: [id number]    Primary Care Provider: No primary care provider on file  Date and time admitted to hospital: 3/7/2021  7:40 PM    * Acute left-sided low back pain with sciatica  Assessment & Plan  Patient with one week history of left sided sciatica symptoms with low back pain  · Uses iv methamphetamine with tap water- last use 1 day ago  · Also complaining of urinary retention and erectile dysfunctions x 2-3 months  · CT lumbar spine w/ moderate L5-S1 bilateral foraminal narrowing  · MRI demonstrated no osteomyelitis, diskitis, epidural abscess, but left paracentral disc extrusion at L5-S1 posteriorly displaces and impinges on S1 nerve root    Plan:  · Consulted neurosurgery, appreciate recs  · ACOSTA today  · Patient stable for d/c following procedure per Dr Nyla Rob  · Robaxin for muscle spasms  · Tylenol and ibuprofen for pain  · Lumbar support brace on discharge  · Medrol dose zelda on discharge    Hypertension  Assessment & Plan  Patient used to be on lisinopril, but has not had a refill for several months   Notes that this was problematic for him and that caused erectile dysfunction    Plan:   Trial amlodipine    Methamphetamine use ([facility] [address] )  Assessment & Plan  Reports IV use of methamphetamine mixed with tap water  Urine screen positive  Reported to ED that he used yesterday but not today     At risk for bacteremia, SEA, endocarditis   CM consulted - patient refuses all information and interventions   Advised to f/u w/ PCP and discussed dangers of continuing use, particularly with ongoing health issues, patient endorsed understanding and agreement    Leukocytosis  Assessment & Plan  Patient mets sepsis criteria on admission with leukocytosis and lactic acidosis   Given active IV drug use as well as new neurologic symptoms as detailed above, there is concern for possible spinal epidural abscess   Received cefepime 2 mg prior to transfer, 2 L IV fluid boluses   Stat MRI approved by Radiology (Dr Ramesh White): No MR evidence for discitis osteomyelitis as clinically questioned  Left paracentral disc extrusion at L5-S1 posteriorly displaces and impinges the descending left S1 nerve root   Moderate to severe bilateral foraminal narrowing is present at this level with contact of the exiting L5 nerve roots  Degenerative changes in the remainder of the lumbar spine as described above   Unclear infectious source, UA negative, BCx NG24, no GI sx, CXR clear    Plan:   Repeat labs this morning w/ increasing WBC to 17 again   Patient now hypertensive to 160s (no indication for continuing fluid at this time)   MRI negative for diskitis, osteomyelitis, epidural abscess   F/u blood cultures   May need to reconsider antibiotics    Hypokalemia-resolved as of 3/8/2021  Assessment & Plan  Potassium of 3 1 on presentation   Received 40mEq PO in ED   Recheck labs and replete prn    Discharging Resident Physician: Enoc Larry MD  Attending: No att  providers found  PCP: No primary care provider on file    Admission Date: 3/7/2021  Discharge Date: 03/09/21    Disposition: Home    Reason for Admission: back pain w/ sciatica and urinary hesitancy    Consultations During Hospital Stay:  · Neurosurgery    Procedures Performed:   · Epidural Steroid Injection (ACOSTA): L5-S1 w/ Dr Luz Dillon    Significant Findings / Test Results:   · CT:  L4-5:  Small posterior disc bulge, mild bilateral facet arthropathy and ligamentum flavum hypertrophy causing mild central canal stenosis and mild bilateral neural foraminal narrowing; L5-S1:  Small posterior disc bulge with bilateral facet arthropathy and ligamentum flavum hypertrophy causing moderate bilateral neural foraminal narrowing without central stenosis; no signs of diskitis or osteomyelitis  · MRI:  L4-5:  Disc space degeneration and narrowing, bulging annulus with posterior annular fissure, facet arthrosis with ligamentum flavum thickening, mild to moderate mass effect on the thecal sac present at this level, moderate bilateral foraminal narrowing abutting the exiting roots; L5-S1:  Disc space degeneration and narrowing, bulging annulus, superimposed left paracentral disc extrusion impinging and posteriorly displacing the descending left S1 nerve root, facet arthrosis, marginal osteophytosis, moderate to severe bilateral foraminal narrowing with impingement of the exiting nerve roots; no evidence of diskitis, osteomyelitis, epidural abscess  · CXR:  No acute cardiopulmonary disease    Incidental Findings:   · None     Test Results Pending at Discharge (will require follow up):   · BCx NG24h but continuing in culture     Outpatient Tests Requested:  · Per PCP and neurosurgery    Complications:  None    Hospital Course:     Jessica Nascimento is a 48 y o  male patient who originally presented to the hospital on 3/7/2021 due to severe back pain and urinary retention/hesitancy    Per initial ED evaluation "58-year-old male presents emerged department complaining of worsening left lower back pain radiating down the left leg   He also reports new onset diaphoresis and difficulty initiating urination and difficulty passing bowel movements   He states that he is an active IV drug user who last used IV methamphetamine last night   He states that he does occasionally reuse needles and syringes   He mixes his methamphetamine with bottled water   He presents significantly diaphoretic with an abnormal gait   He does have a history of sciatica however he states that his sciatica as primarily on his right side and is not this severe   He states this pain is definitely different than his sciatica on the right side previously  [facility] provides "something is wrong  "  He states that he feels sick   He denies chest pain nausea vomiting shortness of breath"     Given his IVDA history there was some suspicion of epidural abscess or osteomyelitis so patient was transferred [facility]  Patient given iv abx prior to transfer  Started on fluids  Though patient met sepsis criteria at the time of transfer for leukocytosis and lactic acidosis, repeat labs on morning of d/c demonstrated rapidly improving leukocytosis and resolution of lactic acidosis x 2  MRI demonstrated no evidence of abscess, osteomyelitis, diskitis, but did show nerve impingement on the S1 root on the left, a likely etiology for his radicular sx  Patient remained afebrile throughout his stay and his leukocytosis on day of discharge (without any signs or symptoms of illness) is attributable to severe pain overnight  Patient seen by Neurosurgery who agreed with lumbar ACOSTA, and will follow-up with patient as an outpatient  Case management some patient regarding his drug use, and he declined all services or resource information  Patient is to be discharged after his ACOSTA on Medrol Dosepak, with ibuprofen and Robaxin p r n , as well as lumbar support brace, and PT  Patient is amenable to this plan and is aware that he will need close outpatient follow-up  He is also aware that the his inpatient procedure may not be curative and they may need to proceed to additional measures to improve his condition  Condition at Discharge: Improved     Discharge Day Visit / Exam:     Subjective:  See progress note    Vitals: Blood Pressure: 157/92 (03/09/21 1222)  Pulse: 81 (03/09/21 1222)  Temperature: 98 2 °F (36 8 °C) (03/09/21 0926)  Respirations: 16 (03/09/21 1217)  Height: 5' 8" (172 7 cm) (03/07/21 2329)  Weight - Scale: 70 3 kg (155 lb) (03/07/21 2329)  SpO2: 92 % (03/09/21 1222)    Exam:  The progress note    Discussion with Family:  N/A    Discharge instructions/Information to patient and family:   See after visit summary for information provided to patient and family        Provisions for Follow-Up Care:  See after visit summary for information related to follow-up care and any pertinent home health orders  Planned Readmission:  No     Discharge Medications:  See after visit summary for reconciled discharge medications provided to patient and family        ** Please Note: This note has been constructed using a voice recognition system **

## 2021-03-10 ENCOUNTER — TELEPHONE (OUTPATIENT)
Dept: NEUROSURGERY | Facility: CLINIC | Age: 51
End: 2021-03-10

## 2021-03-11 DIAGNOSIS — M54.40 ACUTE LEFT-SIDED LOW BACK PAIN WITH SCIATICA, SCIATICA LATERALITY UNSPECIFIED: Primary | ICD-10-CM

## 2021-03-11 RX ORDER — METHYLPREDNISOLONE 4 MG/1
TABLET ORAL
Qty: 23 TABLET | Refills: 0 | Status: SHIPPED | OUTPATIENT
Start: 2021-03-11 | End: 2021-03-23

## 2021-03-12 LAB
BACTERIA BLD CULT: NORMAL
BACTERIA BLD CULT: NORMAL

## 2021-04-12 ENCOUNTER — TELEPHONE (OUTPATIENT)
Dept: NEUROSURGERY | Facility: CLINIC | Age: 51
End: 2021-04-12

## 2021-08-04 ENCOUNTER — APPOINTMENT (OUTPATIENT)
Dept: RADIOLOGY | Facility: MEDICAL CENTER | Age: 51
End: 2021-08-04
Payer: OTHER GOVERNMENT

## 2021-08-04 VITALS
HEART RATE: 74 BPM | BODY MASS INDEX: 25.76 KG/M2 | HEIGHT: 68 IN | WEIGHT: 170 LBS | DIASTOLIC BLOOD PRESSURE: 95 MMHG | SYSTOLIC BLOOD PRESSURE: 153 MMHG

## 2021-08-04 DIAGNOSIS — M70.41 PREPATELLAR BURSITIS, RIGHT KNEE: Primary | ICD-10-CM

## 2021-08-04 DIAGNOSIS — M25.561 RIGHT KNEE PAIN, UNSPECIFIED CHRONICITY: ICD-10-CM

## 2021-08-04 PROCEDURE — 99203 OFFICE O/P NEW LOW 30 MIN: CPT | Performed by: ORTHOPAEDIC SURGERY

## 2021-08-04 PROCEDURE — 20610 DRAIN/INJ JOINT/BURSA W/O US: CPT | Performed by: ORTHOPAEDIC SURGERY

## 2021-08-04 PROCEDURE — 73562 X-RAY EXAM OF KNEE 3: CPT

## 2021-08-04 RX ORDER — LIDOCAINE HYDROCHLORIDE 10 MG/ML
2 INJECTION, SOLUTION INFILTRATION; PERINEURAL
Status: COMPLETED | OUTPATIENT
Start: 2021-08-04 | End: 2021-08-04

## 2021-08-04 RX ORDER — TRIAMCINOLONE ACETONIDE 40 MG/ML
20 INJECTION, SUSPENSION INTRA-ARTICULAR; INTRAMUSCULAR
Status: COMPLETED | OUTPATIENT
Start: 2021-08-04 | End: 2021-08-04

## 2021-08-04 RX ADMIN — LIDOCAINE HYDROCHLORIDE 2 ML: 10 INJECTION, SOLUTION INFILTRATION; PERINEURAL at 15:26

## 2021-08-04 RX ADMIN — TRIAMCINOLONE ACETONIDE 20 MG: 40 INJECTION, SUSPENSION INTRA-ARTICULAR; INTRAMUSCULAR at 15:26
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
## 2021-09-07 ENCOUNTER — OFFICE VISIT (OUTPATIENT)
Dept: OBGYN CLINIC | Facility: CLINIC | Age: 51
End: 2021-09-07
Payer: OTHER GOVERNMENT

## 2021-09-07 VITALS — BODY MASS INDEX: 25.85 KG/M2 | HEIGHT: 68 IN

## 2021-09-07 DIAGNOSIS — M70.41 PREPATELLAR BURSITIS, RIGHT KNEE: Primary | ICD-10-CM

## 2021-09-07 PROCEDURE — 99213 OFFICE O/P EST LOW 20 MIN: CPT | Performed by: ORTHOPAEDIC SURGERY

## 2023-08-16 NOTE — TELEPHONE ENCOUNTER
3/10/2021- CALLED PT AND CONFIRMED 4/12/21 APT WITH MOULDING, NO IMAGING    ----- Message from Trista Morales PA-C sent at 3/9/2021  4:48 PM EST -----  Regarding: hosp f/u    In lead please contact the patient and schedule him a 1 month clinical follow-up with Dr Yuli Abrams, Dr Stephanie Gunderson, Dr Justina Coates, Dr Alee Glynn, Dr Bill Axon 
No